# Patient Record
Sex: FEMALE | Race: WHITE | Employment: OTHER | URBAN - METROPOLITAN AREA
[De-identification: names, ages, dates, MRNs, and addresses within clinical notes are randomized per-mention and may not be internally consistent; named-entity substitution may affect disease eponyms.]

---

## 2017-05-02 ENCOUNTER — APPOINTMENT (OUTPATIENT)
Dept: GENERAL RADIOLOGY | Age: 77
End: 2017-05-02
Attending: FAMILY MEDICINE
Payer: MEDICARE

## 2017-05-02 ENCOUNTER — HOSPITAL ENCOUNTER (OUTPATIENT)
Age: 77
Discharge: HOME | End: 2017-05-02
Attending: FAMILY MEDICINE
Payer: MEDICARE

## 2017-05-02 VITALS
BODY MASS INDEX: 24.55 KG/M2 | DIASTOLIC BLOOD PRESSURE: 71 MMHG | HEIGHT: 61 IN | SYSTOLIC BLOOD PRESSURE: 106 MMHG | TEMPERATURE: 98 F | HEART RATE: 85 BPM | OXYGEN SATURATION: 100 % | WEIGHT: 130 LBS | RESPIRATION RATE: 20 BRPM

## 2017-05-02 DIAGNOSIS — J40 BRONCHITIS: Primary | ICD-10-CM

## 2017-05-02 PROCEDURE — 99203 OFFICE O/P NEW LOW 30 MIN: CPT

## 2017-05-02 PROCEDURE — 99204 OFFICE O/P NEW MOD 45 MIN: CPT

## 2017-05-02 PROCEDURE — 71020 XR CHEST PA + LAT CHEST (CPT=71020): CPT

## 2017-05-02 RX ORDER — BIOTIN 1 MG
2 TABLET ORAL DAILY
COMMUNITY

## 2017-05-02 RX ORDER — CODEINE PHOSPHATE AND GUAIFENESIN 10; 100 MG/5ML; MG/5ML
5 SOLUTION ORAL EVERY 6 HOURS PRN
Qty: 118 ML | Refills: 0 | Status: SHIPPED | OUTPATIENT
Start: 2017-05-02 | End: 2018-10-01 | Stop reason: ALTCHOICE

## 2017-05-02 RX ORDER — VALSARTAN 160 MG/1
160 TABLET ORAL DAILY
COMMUNITY
End: 2019-02-15

## 2017-05-02 RX ORDER — ALBUTEROL SULFATE 90 UG/1
2 AEROSOL, METERED RESPIRATORY (INHALATION) EVERY 4 HOURS PRN
Qty: 1 INHALER | Refills: 1 | Status: SHIPPED | OUTPATIENT
Start: 2017-05-02 | End: 2017-06-01

## 2017-05-02 RX ORDER — SIMVASTATIN 5 MG
20 TABLET ORAL NIGHTLY
COMMUNITY
End: 2018-10-01 | Stop reason: ALTCHOICE

## 2017-05-02 RX ORDER — LAMOTRIGINE 150 MG/1
150 TABLET ORAL DAILY
COMMUNITY
End: 2018-12-31

## 2017-05-02 NOTE — ED PROVIDER NOTES
Patient Seen in: [address]    History   Patient presents with:  Cough/URI    Stated Complaint: BAD COUGH    HPI    69year old patient with PMHx significant for HL presents with cough, congestion for 3 days.    [address] (36.6 °C)   Temp src 05/02/17 1229 Oral   SpO2 05/02/17 1229 100 %   O2 Device --        Current:/71 mmHg  Pulse 85  Temp(Src) 97.9 °F (36.6 °C) (Oral)  Resp 20  Ht 154.9 cm (5' 1\")  Wt 58.968 kg  BMI 24.58 kg/m2  SpO2 100%    GENERAL: NAD, well hyd on 5/02/2017 at 12:54       Approved by (CST): Christianne Noonan M.D. on 5/02/2017 at 12:55       Discussed viral nature of the patient's symptoms. She agrees she does not want an antibiotic at this time.   She is offered Cheratussin which she accepts a

## 2017-05-02 NOTE — ED INITIAL ASSESSMENT (HPI)
Per pt has been having cough for 3 days denies fevers or chills at home.  Non productive cough with runny nose and congestion

## 2017-05-19 ENCOUNTER — APPOINTMENT (OUTPATIENT)
Age: 77
Setting detail: DERMATOLOGY
End: 2017-05-24

## 2017-05-19 ENCOUNTER — RX ONLY (RX ONLY)
Age: 77
End: 2017-05-19

## 2017-05-19 VITALS
HEART RATE: 75 BPM | HEIGHT: 55 IN | WEIGHT: 130 LBS | SYSTOLIC BLOOD PRESSURE: 112 MMHG | DIASTOLIC BLOOD PRESSURE: 62 MMHG

## 2017-05-19 DIAGNOSIS — L81.4 OTHER MELANIN HYPERPIGMENTATION: ICD-10-CM

## 2017-05-19 DIAGNOSIS — L21.8 OTHER SEBORRHEIC DERMATITIS: ICD-10-CM

## 2017-05-19 DIAGNOSIS — T07XXXA ABRASION OR FRICTION BURN OF OTHER, MULTIPLE, AND UNSPECIFIED SITES, WITHOUT MENTION OF INFECTION: ICD-10-CM

## 2017-05-19 DIAGNOSIS — L82.1 OTHER SEBORRHEIC KERATOSIS: ICD-10-CM

## 2017-05-19 DIAGNOSIS — L30.4 ERYTHEMA INTERTRIGO: ICD-10-CM

## 2017-05-19 DIAGNOSIS — L82.0 INFLAMED SEBORRHEIC KERATOSIS: ICD-10-CM

## 2017-05-19 PROBLEM — S60.922A UNSPECIFIED SUPERFICIAL INJURY OF LEFT HAND, INITIAL ENCOUNTER: Status: ACTIVE | Noted: 2017-05-19

## 2017-05-19 PROCEDURE — OTHER MIPS QUALITY: OTHER

## 2017-05-19 PROCEDURE — OTHER COUNSELING: OTHER

## 2017-05-19 PROCEDURE — OTHER LIQUID NITROGEN: OTHER

## 2017-05-19 PROCEDURE — 99214 OFFICE O/P EST MOD 30 MIN: CPT | Mod: 25

## 2017-05-19 PROCEDURE — OTHER PRESCRIPTION: OTHER

## 2017-05-19 PROCEDURE — OTHER PATIENT SPECIFIC COUNSELING: OTHER

## 2017-05-19 PROCEDURE — OTHER TREATMENT REGIMEN: OTHER

## 2017-05-19 PROCEDURE — OTHER REASSURANCE: OTHER

## 2017-05-19 PROCEDURE — 17110 DESTRUCT B9 LESION 1-14: CPT

## 2017-05-19 RX ORDER — TRIAMCINOLONE ACETONIDE 1 MG/G
CREAM TOPICAL
Qty: 1 | Refills: 2 | Status: ERX | COMMUNITY
Start: 2017-05-19

## 2017-05-19 RX ORDER — MUPIROCIN 20 MG/G
OINTMENT TOPICAL
Qty: 1 | Refills: 0 | Status: ERX | COMMUNITY
Start: 2017-05-19

## 2017-05-19 RX ORDER — KETOCONAZOLE 20.5 MG/ML
SHAMPOO, SUSPENSION TOPICAL
Qty: 1 | Refills: 2 | Status: ERX | COMMUNITY
Start: 2017-05-19

## 2017-05-19 RX ORDER — KETOCONAZOLE 20 MG/G
CREAM TOPICAL
Qty: 1 | Refills: 2 | Status: ERX

## 2017-05-19 ASSESSMENT — LOCATION DETAILED DESCRIPTION DERM
LOCATION DETAILED: RIGHT INFERIOR UPPER BACK
LOCATION DETAILED: LEFT SUPERIOR MEDIAL UPPER BACK
LOCATION DETAILED: LEFT ANTERIOR PROXIMAL THIGH
LOCATION DETAILED: LEFT INFERIOR MEDIAL UPPER BACK
LOCATION DETAILED: LEFT MEDIAL FRONTAL SCALP
LOCATION DETAILED: LEFT INFERIOR FOREHEAD
LOCATION DETAILED: LEFT MEDIAL SUPERIOR CHEST
LOCATION DETAILED: RIGHT ANTERIOR SHOULDER
LOCATION DETAILED: LEFT SUPERIOR UPPER BACK
LOCATION DETAILED: LEFT RADIAL DORSAL HAND
LOCATION DETAILED: RIGHT MEDIAL BREAST 5-6:00 REGION
LOCATION DETAILED: LEFT ANTERIOR PROXIMAL UPPER ARM
LOCATION DETAILED: RIGHT INFERIOR MEDIAL FOREHEAD
LOCATION DETAILED: LEFT MID-UPPER BACK
LOCATION DETAILED: LEFT SUPERIOR PARIETAL SCALP
LOCATION DETAILED: LEFT MEDIAL BREAST 6-7:00 REGION
LOCATION DETAILED: LEFT PROXIMAL CALF
LOCATION DETAILED: RIGHT CLAVICULAR SKIN
LOCATION DETAILED: RIGHT MID-UPPER BACK

## 2017-05-19 ASSESSMENT — LOCATION SIMPLE DESCRIPTION DERM
LOCATION SIMPLE: LEFT UPPER BACK
LOCATION SIMPLE: LEFT SCALP
LOCATION SIMPLE: LEFT UPPER ARM
LOCATION SIMPLE: LEFT CALF
LOCATION SIMPLE: SCALP
LOCATION SIMPLE: RIGHT BREAST
LOCATION SIMPLE: RIGHT UPPER BACK
LOCATION SIMPLE: CHEST
LOCATION SIMPLE: LEFT THIGH
LOCATION SIMPLE: LEFT FOREHEAD
LOCATION SIMPLE: LEFT BREAST
LOCATION SIMPLE: RIGHT SHOULDER
LOCATION SIMPLE: RIGHT CLAVICULAR SKIN
LOCATION SIMPLE: RIGHT FOREHEAD
LOCATION SIMPLE: LEFT HAND

## 2017-05-19 ASSESSMENT — SEVERITY ASSESSMENT
HOW SEVERE IS THIS PATIENT'S CONDITION?: MILD
SEVERITY: MILD

## 2017-05-19 ASSESSMENT — LOCATION ZONE DERM
LOCATION ZONE: TRUNK
LOCATION ZONE: FACE
LOCATION ZONE: LEG
LOCATION ZONE: ARM
LOCATION ZONE: HAND
LOCATION ZONE: SCALP

## 2017-05-19 ASSESSMENT — PAIN INTENSITY VAS: HOW INTENSE IS YOUR PAIN 0 BEING NO PAIN, 10 BEING THE MOST SEVERE PAIN POSSIBLE?: NO PAIN

## 2017-05-19 ASSESSMENT — BSA RASH: BSA RASH: 2

## 2017-05-19 NOTE — PROCEDURE: LIQUID NITROGEN
Post-Care Instructions: I reviewed with the patient in detail post-care instructions. Patient is to wear sunprotection, and avoid picking at any of the treated lesions. Pt may apply Vaseline to crusted or scabbing areas.
Number Of Freeze-Thaw Cycles: 2 freeze-thaw cycles
Include Z78.9 (Other Specified Conditions Influencing Health Status) As An Associated Diagnosis?: No
Medical Necessity Information: It is in your best interest to select a reason for this procedure from the list below. All of these items fulfill various CMS LCD requirements except the new and changing color options.
Medical Necessity Clause: This procedure was medically necessary because the lesions that were treated were:
Detail Level: Simple
Consent: The patient's consent was obtained including but not limited to risks of crusting, scabbing, blistering, scarring, darker or lighter pigmentary change, recurrence, incomplete removal and infection.

## 2017-05-19 NOTE — HPI: SECONDARY COMPLAINT
How Severe Is This Condition?: mild
Additional History: Patient is unsure whether the area is a cut or burn, she just noticed it yesterday.

## 2017-05-19 NOTE — PROCEDURE: PATIENT SPECIFIC COUNSELING
Detail Level: Detailed
Explained moisture exacerbates condition. Recommended gently blow drying areas to ensure skin is fully dry before getting dressed. Condition is likely chronic but may be well aided with topical Ketoconazole 2% cream regimen.

## 2017-11-28 ENCOUNTER — APPOINTMENT (OUTPATIENT)
Age: 77
Setting detail: DERMATOLOGY
End: 2017-12-01

## 2017-11-28 ENCOUNTER — RX ONLY (RX ONLY)
Age: 77
End: 2017-11-28

## 2017-11-28 VITALS
SYSTOLIC BLOOD PRESSURE: 106 MMHG | HEART RATE: 73 BPM | DIASTOLIC BLOOD PRESSURE: 56 MMHG | HEIGHT: 61 IN | WEIGHT: 130 LBS

## 2017-11-28 DIAGNOSIS — D22 MELANOCYTIC NEVI: ICD-10-CM

## 2017-11-28 DIAGNOSIS — L82.1 OTHER SEBORRHEIC KERATOSIS: ICD-10-CM

## 2017-11-28 DIAGNOSIS — L30.4 ERYTHEMA INTERTRIGO: ICD-10-CM

## 2017-11-28 DIAGNOSIS — R20.2 PARESTHESIA OF SKIN: ICD-10-CM

## 2017-11-28 DIAGNOSIS — L21.8 OTHER SEBORRHEIC DERMATITIS: ICD-10-CM

## 2017-11-28 PROBLEM — D48.5 NEOPLASM OF UNCERTAIN BEHAVIOR OF SKIN: Status: ACTIVE | Noted: 2017-11-28

## 2017-11-28 PROCEDURE — 11100: CPT

## 2017-11-28 PROCEDURE — OTHER COUNSELING: OTHER

## 2017-11-28 PROCEDURE — OTHER PATIENT SPECIFIC COUNSELING: OTHER

## 2017-11-28 PROCEDURE — OTHER TREATMENT REGIMEN: OTHER

## 2017-11-28 PROCEDURE — OTHER MIPS QUALITY: OTHER

## 2017-11-28 PROCEDURE — OTHER REASSURANCE: OTHER

## 2017-11-28 PROCEDURE — OTHER BIOPSY BY SHAVE METHOD: OTHER

## 2017-11-28 PROCEDURE — 99214 OFFICE O/P EST MOD 30 MIN: CPT | Mod: 25

## 2017-11-28 RX ORDER — KETOCONAZOLE 20 MG/G
CREAM TOPICAL
Qty: 1 | Refills: 2 | Status: ERX

## 2017-11-28 ASSESSMENT — LOCATION DETAILED DESCRIPTION DERM
LOCATION DETAILED: LEFT MEDIAL BREAST 6-7:00 REGION
LOCATION DETAILED: LEFT MID-UPPER BACK
LOCATION DETAILED: LEFT SUPERIOR MEDIAL UPPER BACK
LOCATION DETAILED: LEFT LATERAL SUPERIOR CHEST
LOCATION DETAILED: MIDDLE STERNUM
LOCATION DETAILED: RIGHT MEDIAL BREAST 5-6:00 REGION
LOCATION DETAILED: RIGHT INFERIOR UPPER BACK
LOCATION DETAILED: MID-FRONTAL SCALP

## 2017-11-28 ASSESSMENT — LOCATION SIMPLE DESCRIPTION DERM
LOCATION SIMPLE: LEFT UPPER BACK
LOCATION SIMPLE: CHEST
LOCATION SIMPLE: LEFT BREAST
LOCATION SIMPLE: RIGHT BREAST
LOCATION SIMPLE: ANTERIOR SCALP
LOCATION SIMPLE: RIGHT UPPER BACK

## 2017-11-28 ASSESSMENT — LOCATION ZONE DERM
LOCATION ZONE: SCALP
LOCATION ZONE: TRUNK

## 2017-11-28 ASSESSMENT — ITCH INTENSITY: HOW SEVERE IS YOUR ITCHING?: 5

## 2017-11-28 ASSESSMENT — SEVERITY ASSESSMENT
HOW SEVERE IS THIS PATIENT'S CONDITION?: MILD
SEVERITY: MILD

## 2017-11-28 ASSESSMENT — BSA RASH: BSA RASH: 2

## 2017-11-28 ASSESSMENT — PAIN INTENSITY VAS: HOW INTENSE IS YOUR PAIN 0 BEING NO PAIN, 10 BEING THE MOST SEVERE PAIN POSSIBLE?: NO PAIN

## 2017-11-28 NOTE — PROCEDURE: BIOPSY BY SHAVE METHOD
Biopsy Method: Personna blade
Electrodesiccation Text: The wound bed was treated with electrodesiccation after the biopsy was performed.
Anesthesia Volume In Cc (Will Not Render If 0): 0.5
Size Of Lesion In Cm: 1
Consent: Written consent was obtained and risks were reviewed including but not limited to scarring, infection, bleeding, scabbing, incomplete removal, nerve damage and allergy to anesthesia.
Post-Care Instructions: I reviewed with the patient in detail post-care instructions. Patient is to keep the biopsy site dry overnight, and then apply bacitracin twice daily until healed. Patient may apply hydrogen peroxide soaks to remove any crusting.
Bill 64507 For Specimen Handling/Conveyance To Laboratory?: no
Cryotherapy Text: The wound bed was treated with cryotherapy after the biopsy was performed.
Notification Instructions: Patient will be notified of biopsy results. However, patient instructed to call the office if not contacted within 2 weeks.
Biopsy Type: H and E
Electrodesiccation And Curettage Text: The wound bed was treated with electrodesiccation and curettage after the biopsy was performed.
Type Of Destruction Used: Curettage
Billing Type: Third-Party Bill
Hemostasis: Electrocautery
Detail Level: Detailed
Silver Nitrate Text: The wound bed was treated with silver nitrate after the biopsy was performed.
Dressing: bandage
Additional Anesthesia Volume In Cc (Will Not Render If 0): 0
Wound Care: Vaseline

## 2017-11-28 NOTE — PROCEDURE: MIPS QUALITY
Quality 265: Biopsy Follow-Up: Biopsy results reviewed, communicated, tracked, and documented
Detail Level: Zone
Quality 131: Pain Assessment And Follow-Up: Pain assessment using a standardized tool is documented as negative, no follow-up plan required
Quality 226: Preventive Care And Screening: Tobacco Use: Screening And Cessation Intervention: Patient screened for tobacco and is an ex-smoker

## 2017-11-28 NOTE — PROCEDURE: PATIENT SPECIFIC COUNSELING
Detail Level: Detailed
Discussed alternative treatment options including topical solution due to patient not preferring shampoo form. Patient defers any alternative treatment options and elects to continue Ketoconazole 2% shampoo as needed.
Explained that the condition is due to sensory nerve itching and irritation. Use of topical steroid may temporarily relieve the condition as well as application of cold compress. Condition is likely long term and is difficult to maintain.

## 2018-05-22 ENCOUNTER — APPOINTMENT (OUTPATIENT)
Age: 78
Setting detail: DERMATOLOGY
End: 2018-05-26

## 2018-05-22 VITALS
DIASTOLIC BLOOD PRESSURE: 66 MMHG | WEIGHT: 130 LBS | HEIGHT: 61 IN | SYSTOLIC BLOOD PRESSURE: 113 MMHG | HEART RATE: 73 BPM

## 2018-05-22 DIAGNOSIS — D22 MELANOCYTIC NEVI: ICD-10-CM

## 2018-05-22 DIAGNOSIS — L81.4 OTHER MELANIN HYPERPIGMENTATION: ICD-10-CM

## 2018-05-22 DIAGNOSIS — D18.0 HEMANGIOMA: ICD-10-CM

## 2018-05-22 DIAGNOSIS — L82.1 OTHER SEBORRHEIC KERATOSIS: ICD-10-CM

## 2018-05-22 PROBLEM — D22.72 MELANOCYTIC NEVI OF LEFT LOWER LIMB, INCLUDING HIP: Status: ACTIVE | Noted: 2018-05-22

## 2018-05-22 PROBLEM — D22.71 MELANOCYTIC NEVI OF RIGHT LOWER LIMB, INCLUDING HIP: Status: ACTIVE | Noted: 2018-05-22

## 2018-05-22 PROBLEM — D22.5 MELANOCYTIC NEVI OF TRUNK: Status: ACTIVE | Noted: 2018-05-22

## 2018-05-22 PROBLEM — D18.01 HEMANGIOMA OF SKIN AND SUBCUTANEOUS TISSUE: Status: ACTIVE | Noted: 2018-05-22

## 2018-05-22 PROCEDURE — OTHER REASSURANCE: OTHER

## 2018-05-22 PROCEDURE — 99214 OFFICE O/P EST MOD 30 MIN: CPT

## 2018-05-22 PROCEDURE — OTHER MIPS QUALITY: OTHER

## 2018-05-22 ASSESSMENT — LOCATION ZONE DERM
LOCATION ZONE: LEG
LOCATION ZONE: FACE
LOCATION ZONE: ARM
LOCATION ZONE: NECK
LOCATION ZONE: TRUNK

## 2018-05-22 ASSESSMENT — LOCATION SIMPLE DESCRIPTION DERM
LOCATION SIMPLE: TRAPEZIAL NECK
LOCATION SIMPLE: RIGHT CALF
LOCATION SIMPLE: LEFT UPPER BACK
LOCATION SIMPLE: RIGHT UPPER BACK
LOCATION SIMPLE: CHEST
LOCATION SIMPLE: RIGHT POSTERIOR UPPER ARM
LOCATION SIMPLE: LEFT THIGH
LOCATION SIMPLE: LEFT POSTERIOR UPPER ARM
LOCATION SIMPLE: RIGHT CHEEK
LOCATION SIMPLE: LEFT FOREHEAD

## 2018-05-22 ASSESSMENT — LOCATION DETAILED DESCRIPTION DERM
LOCATION DETAILED: LEFT PROXIMAL POSTERIOR UPPER ARM
LOCATION DETAILED: LEFT ANTERIOR PROXIMAL THIGH
LOCATION DETAILED: RIGHT INFERIOR UPPER BACK
LOCATION DETAILED: RIGHT MEDIAL MALAR CHEEK
LOCATION DETAILED: RIGHT PROXIMAL CALF
LOCATION DETAILED: LEFT MEDIAL UPPER BACK
LOCATION DETAILED: UPPER STERNUM
LOCATION DETAILED: RIGHT SUPERIOR MEDIAL UPPER BACK
LOCATION DETAILED: LEFT MEDIAL FOREHEAD
LOCATION DETAILED: MID TRAPEZIAL NECK
LOCATION DETAILED: RIGHT PROXIMAL POSTERIOR UPPER ARM

## 2018-05-22 NOTE — HPI: SKIN LESION
How Severe Is Your Skin Lesion?: mild
Has Your Skin Lesion Been Treated?: not been treated
Is This A New Presentation, Or A Follow-Up?: Skin Lesion
Additional History: Patient states Dr. Jerez stated lesion was nothing to worry about, but she would like lesion evaluated.

## 2018-10-01 ENCOUNTER — OFFICE VISIT (OUTPATIENT)
Dept: FAMILY MEDICINE CLINIC | Facility: CLINIC | Age: 78
End: 2018-10-01
Payer: MEDICARE

## 2018-10-01 VITALS
TEMPERATURE: 97 F | DIASTOLIC BLOOD PRESSURE: 73 MMHG | HEIGHT: 60 IN | RESPIRATION RATE: 18 BRPM | HEART RATE: 76 BPM | BODY MASS INDEX: 24.5 KG/M2 | WEIGHT: 124.81 LBS | SYSTOLIC BLOOD PRESSURE: 124 MMHG

## 2018-10-01 DIAGNOSIS — Z00.00 ENCOUNTER FOR ANNUAL HEALTH EXAMINATION: ICD-10-CM

## 2018-10-01 DIAGNOSIS — Z00.00 ROUTINE PHYSICAL EXAMINATION: Primary | ICD-10-CM

## 2018-10-01 DIAGNOSIS — Z12.31 ENCOUNTER FOR SCREENING MAMMOGRAM FOR BREAST CANCER: ICD-10-CM

## 2018-10-01 DIAGNOSIS — F31.70 BIPOLAR AFFECTIVE DISORDER IN REMISSION (HCC): ICD-10-CM

## 2018-10-01 DIAGNOSIS — M54.16 LUMBAR RADICULOPATHY: ICD-10-CM

## 2018-10-01 DIAGNOSIS — I10 ESSENTIAL HYPERTENSION: ICD-10-CM

## 2018-10-01 DIAGNOSIS — Z12.11 COLON CANCER SCREENING: ICD-10-CM

## 2018-10-01 DIAGNOSIS — E78.5 HYPERLIPIDEMIA, UNSPECIFIED HYPERLIPIDEMIA TYPE: ICD-10-CM

## 2018-10-01 PROCEDURE — G0439 PPPS, SUBSEQ VISIT: HCPCS | Performed by: FAMILY MEDICINE

## 2018-10-01 RX ORDER — ATORVASTATIN CALCIUM 10 MG/1
10 TABLET, FILM COATED ORAL NIGHTLY
COMMUNITY
End: 2019-05-20

## 2018-10-01 RX ORDER — DIAZEPAM 2 MG/1
2 TABLET ORAL EVERY 6 HOURS PRN
COMMUNITY
End: 2018-10-29

## 2018-10-01 NOTE — PROGRESS NOTES
HPI:    Patient ID: Elaine Perez is a 69year old female. HPI    Review of Systems         Current Outpatient Medications:  atorvastatin 10 MG Oral Tab Take 10 mg by mouth nightly.  Disp:  Rfl:    diazepam 2 MG Oral Tab Take 2 mg by mouth every 6 (si medication    Hyperlipidemia–continuing on atorvastatin    Bipolar affective disorder in remission –patient has seen psychiatrists and therapists in the past.  Diagnosed with bipolar disorder.   Has been stable on lamotrigine and diazepam at at bedtime for

## 2018-10-01 NOTE — PROGRESS NOTES
HPI:   Philip Melchor is a 69year old female who presents for a Medicare Subsequent Annual Wellness visit (Pt already had Initial Annual Wellness).     Generally well, recently moved  Her last annual assessment has been over 1 year: Annual Physical due weeks)?: Several days  PHQ-2 SCORE: 1     Advanced Directive:   She does NOT have a Living Will on file in [facility] [address]    Advance care planning including the explanation and discussion of advance directives standard forms performed Face to Face with patient and F Cap Take 1 tablet by mouth daily. valsartan 160 MG Oral Tab Take 160 mg by mouth daily. MEDICAL INFORMATION:   She  has a past medical history of Depression, Essential hypertension, and Hyperlipidemia.     She  has a past surgical history that includ me they think I may have hearing loss:   No              Visual Acuity  Right Eye Visual Acuity: Uncorrected Right Eye Chart Acuity: 20/50   Left Eye Visual Acuity: Uncorrected Left Eye Chart Acuity: 20/50   Both Eyes Visual Acuity: Uncorrected Both Eyes Ch screening  -     EVALUATE & TREAT, GASTRO (INTERNAL)    Essential hypertension    Hyperlipidemia, unspecified hyperlipidemia type    Lumbar radiculopathy  -     PHYSICAL THERAPY - INTERNAL    Bipolar affective disorder in remission [facility])    Encounter for a side effects. May reconsider in the future. Referred for physical therapy which has been effective in the past.    Diet assessment: good     PLAN:  The patient indicates understanding of these issues and agrees to the plan.   Reinforced healthy diet, life and Pelvic      Pap: Every 3 yrs age 21-68 or Pap+HPV every 5 yrs age 33-67, age 72 and older at high risk There are no preventive care reminders to display for this patient.  Update Health Maintenance if applicable    Chlamydia  Annually if high risk No re No flowsheet data found.                Template: [employer] ANNUAL ASSESSMENT FEMALE [69316]

## 2018-10-01 NOTE — PATIENT INSTRUCTIONS
[facility]  Physical therapist in [address]  Address: [place] [address], [address] [facility]  Phone: [phone]    [facility]3]  Physical therapy clinic in [address]  Address: [address] [facility], [facility] lifetime   • Anyone with a family history    Colorectal Cancer Screening  Covered up to Age 76     Colonoscopy Screen   Covered every 10 years- more often if abnormal There are no preventive care reminders to display for this patient.  Update [place]ub[place] Please get every year    Pneumococcal 13 (Prevnar)  Covered Once after 65 No orders found for this or any previous visit.  Please get once after your 65th birthday    Pneumococcal 23 (Pneumovax)  Covered Once after 65 No orders found for this or any previou

## 2018-10-11 ENCOUNTER — TELEPHONE (OUTPATIENT)
Dept: GASTROENTEROLOGY | Facility: CLINIC | Age: 78
End: 2018-10-11

## 2018-10-11 NOTE — TELEPHONE ENCOUNTER
Per CSS Joan Chandler, pt confirmed the following appt and cancelled the 10/30/18 date.     Future Appointments   Date Time Provider Zhang Aguilar   10/18/2018  8:00 AM ANA [facility] Javier AQUINO

## 2018-10-11 NOTE — TELEPHONE ENCOUNTER
NEW PATIENT NEVER SEEN BY GI    LM to offer appt sooner consult with [facility] on 10/18/18 @ 8AM, must arrive by 7:45AM at [facility]. CSS please confirm appt and re-route to RN's as FYI.

## 2018-10-11 NOTE — TELEPHONE ENCOUNTER
Pt daughter calling to see if pt can be seen sooner then scheduled 10/30/18 daughter is concerned because pt is losing a lot of weight please call thank you.

## 2018-10-15 NOTE — H&P
[address] [facility] [address] Gastroenterology                                                                                                  Clinic History and Physical     Pa PCPs office in [place] compared with her newly established PCP in [facility]. Today in office her weight is up 228 pounds. She reports her baseline is 131 pounds. She does not on a scale at home and does not weigh herself regularly.       She believes this Social History: Social History    Tobacco Use      Smoking status: Former Smoker      Smokeless tobacco: Never Used      Tobacco comment: quit 20 to 15 yrs ago    Alcohol use:  Yes      Alcohol/week: 0.6 oz      Types: 1 Glasses of wine per week      D moist  CV: regular rate and rhythm, the extremities are warm and well perfused   Lung: effort normal and breath sounds normal, no respiratory distress, wheezes or rales  GI: soft, non-tender exam in all quadrants without rigidity or guarding, non-distended PPIs including the risks, benefits, limitations of both medications. She has elected to continue with omeprazole 20 mg daily. Rx sent to pharmacy.     3.  Weight loss: Patient's recent weight loss may have been in part due to forgetting meals and decrease Sig: As directed per GI consult notes   • Omeprazole 20 MG Oral Tab EC 90 tablet 1     Sig: Take 1 tablet by mouth daily. Imaging & Referrals:  None       Cc: Dr. Donaldo Arce.  ANA Bartlett  10/18/2018

## 2018-10-18 ENCOUNTER — OFFICE VISIT (OUTPATIENT)
Dept: GASTROENTEROLOGY | Facility: CLINIC | Age: 78
End: 2018-10-18
Payer: MEDICARE

## 2018-10-18 ENCOUNTER — TELEPHONE (OUTPATIENT)
Dept: GASTROENTEROLOGY | Facility: CLINIC | Age: 78
End: 2018-10-18

## 2018-10-18 VITALS
WEIGHT: 128.38 LBS | DIASTOLIC BLOOD PRESSURE: 76 MMHG | HEART RATE: 73 BPM | SYSTOLIC BLOOD PRESSURE: 122 MMHG | BODY MASS INDEX: 25.2 KG/M2 | HEIGHT: 60 IN

## 2018-10-18 DIAGNOSIS — K21.9 GERD WITHOUT ESOPHAGITIS: ICD-10-CM

## 2018-10-18 DIAGNOSIS — R63.4 WEIGHT LOSS: ICD-10-CM

## 2018-10-18 DIAGNOSIS — Z12.11 COLON CANCER SCREENING: Primary | ICD-10-CM

## 2018-10-18 DIAGNOSIS — Z12.11 SCREENING FOR COLON CANCER: Primary | ICD-10-CM

## 2018-10-18 PROCEDURE — 99203 OFFICE O/P NEW LOW 30 MIN: CPT | Performed by: NURSE PRACTITIONER

## 2018-10-18 PROCEDURE — G0463 HOSPITAL OUTPT CLINIC VISIT: HCPCS | Performed by: NURSE PRACTITIONER

## 2018-10-18 RX ORDER — POLYETHYLENE GLYCOL 3350, SODIUM CHLORIDE, SODIUM BICARBONATE, POTASSIUM CHLORIDE 420; 11.2; 5.72; 1.48 G/4L; G/4L; G/4L; G/4L
POWDER, FOR SOLUTION ORAL
Qty: 1 BOTTLE | Refills: 0 | Status: SHIPPED | OUTPATIENT
Start: 2018-10-18 | End: 2019-03-11 | Stop reason: ALTCHOICE

## 2018-10-18 RX ORDER — NICOTINE POLACRILEX 4 MG/1
1 GUM, CHEWING ORAL DAILY
Qty: 90 TABLET | Refills: 1 | Status: SHIPPED | OUTPATIENT
Start: 2018-10-18

## 2018-10-18 NOTE — PATIENT INSTRUCTIONS
1. Schedule colonoscopy with Dr. Cathrine Cranker w/ [facility]    2.  bowel prep from pharmacy - split dose TriLyte     3. Continue all medications for procedure     4. Read all bowel prep instructions carefully    5.  AVOID seeds, nuts, popcorn, raw fruits and vege

## 2018-10-18 NOTE — TELEPHONE ENCOUNTER
Scheduled for:  Colonoscopy - 24219  Provider Name:  Dr. Oma Gibbons  Date:  10/31/18  Location:  [facility]  Sedation:  [facility]  Time:  12:30 pm (pt is aware to arrive at 11:30 am)  Prep:  Trilyte, Prep instructions were given to pt in the office, pt verbalized und

## 2018-10-19 ENCOUNTER — TELEPHONE (OUTPATIENT)
Dept: GASTROENTEROLOGY | Facility: CLINIC | Age: 78
End: 2018-10-19

## 2018-10-19 DIAGNOSIS — Z12.11 COLON CANCER SCREENING: Primary | ICD-10-CM

## 2018-10-19 NOTE — TELEPHONE ENCOUNTER
Pts daughter [facility] called again about rescheduling. Please call. Aware of 72 hr call back time.

## 2018-10-22 NOTE — TELEPHONE ENCOUNTER
Rescheduled for:  Colonoscopy [address]  Provider Name:  Dr. Dagoberto Lees  Date:    From-10/31/18 To-11/7/18  [facility]  Sedation:  [facility]  Time:    From-1230   To-1315 (pt is aware to arrive at 1215)  Prep:  Percy Camejo prep instructions sent on 10/23

## 2018-10-29 RX ORDER — DIAZEPAM 2 MG/1
2 TABLET ORAL NIGHTLY PRN
Qty: 30 TABLET | Refills: 5 | Status: SHIPPED
Start: 2018-10-29

## 2018-10-29 NOTE — TELEPHONE ENCOUNTER
Per pt she is new to the pharmacy anf she needs a refill on her   diazepam 2 MG Oral Tab. Current Outpatient Medications:                    diazepam 2 MG Oral Tab Take 2 mg by mouth every 6 (six) hours as needed for Anxiety.  Disp:  Rfl:

## 2018-10-29 NOTE — TELEPHONE ENCOUNTER
Please advise on refill request.     Refill Protocol Appointment Criteria  · Appointment scheduled in the past 6 months or in the next 3 months  Recent Outpatient Visits            1 week ago Screening for colon cancer    [address] [facility], [address] [place] [address],

## 2018-11-07 ENCOUNTER — HOSPITAL ENCOUNTER (OUTPATIENT)
Age: 78
Setting detail: HOSPITAL OUTPATIENT SURGERY
Discharge: HOME | End: 2018-11-07
Attending: INTERNAL MEDICINE | Admitting: INTERNAL MEDICINE
Payer: MEDICARE

## 2018-11-07 ENCOUNTER — ANESTHESIA EVENT (OUTPATIENT)
Dept: ENDOSCOPY | Age: 78
End: 2018-11-07
Payer: MEDICARE

## 2018-11-07 ENCOUNTER — ANESTHESIA (OUTPATIENT)
Dept: ENDOSCOPY | Age: 78
End: 2018-11-07
Payer: MEDICARE

## 2018-11-07 VITALS
RESPIRATION RATE: 20 BRPM | HEIGHT: 60 IN | HEART RATE: 79 BPM | DIASTOLIC BLOOD PRESSURE: 70 MMHG | WEIGHT: 127 LBS | OXYGEN SATURATION: 98 % | SYSTOLIC BLOOD PRESSURE: 124 MMHG | BODY MASS INDEX: 24.94 KG/M2

## 2018-11-07 DIAGNOSIS — Z12.11 COLON CANCER SCREENING: ICD-10-CM

## 2018-11-07 PROCEDURE — 45378 DIAGNOSTIC COLONOSCOPY: CPT | Performed by: INTERNAL MEDICINE

## 2018-11-07 RX ORDER — SODIUM CHLORIDE, SODIUM LACTATE, POTASSIUM CHLORIDE, CALCIUM CHLORIDE 600; 310; 30; 20 MG/100ML; MG/100ML; MG/100ML; MG/100ML
INJECTION, SOLUTION INTRAVENOUS CONTINUOUS
Status: DISCONTINUED | OUTPATIENT
Start: 2018-11-07 | End: 2018-11-07

## 2018-11-07 RX ORDER — NALOXONE HYDROCHLORIDE 0.4 MG/ML
80 INJECTION, SOLUTION INTRAMUSCULAR; INTRAVENOUS; SUBCUTANEOUS AS NEEDED
Status: CANCELLED | OUTPATIENT
Start: 2018-11-07 | End: 2018-11-07

## 2018-11-07 RX ORDER — LIDOCAINE HYDROCHLORIDE 10 MG/ML
INJECTION, SOLUTION EPIDURAL; INFILTRATION; INTRACAUDAL; PERINEURAL AS NEEDED
Status: DISCONTINUED | OUTPATIENT
Start: 2018-11-07 | End: 2018-11-07 | Stop reason: SURG

## 2018-11-07 RX ORDER — SODIUM CHLORIDE, SODIUM LACTATE, POTASSIUM CHLORIDE, CALCIUM CHLORIDE 600; 310; 30; 20 MG/100ML; MG/100ML; MG/100ML; MG/100ML
INJECTION, SOLUTION INTRAVENOUS CONTINUOUS
Status: CANCELLED | OUTPATIENT
Start: 2018-11-07

## 2018-11-07 RX ADMIN — SODIUM CHLORIDE, SODIUM LACTATE, POTASSIUM CHLORIDE, CALCIUM CHLORIDE: 600; 310; 30; 20 INJECTION, SOLUTION INTRAVENOUS at 13:55:00

## 2018-11-07 RX ADMIN — LIDOCAINE HYDROCHLORIDE 50 MG: 10 INJECTION, SOLUTION EPIDURAL; INFILTRATION; INTRACAUDAL; PERINEURAL at 13:58:00

## 2018-11-07 NOTE — OPERATIVE REPORT
COLONOSCOPY PROCEDURE REPORT     DATE OF PROCEDURE:  11/7/2018     PCP: Alva Willard. Jennifer Cornell MD     PREOPERATIVE DIAGNOSIS: Screening colonoscopy examination     POSTOPERATIVE DIAGNOSIS:  See impression. SURGEON:  Abdoul Garcia M.D.     Hiro Lugo:

## 2018-11-07 NOTE — ANESTHESIA PREPROCEDURE EVALUATION
Anesthesia PreOp Note    HPI:     Taylor Oswald is a 69year old female who presents for preoperative consultation requested by: Gertrude Prieto MD    Date of Surgery: 11/7/2018    Procedure(s):  COLONOSCOPY  Indication: Colon cancer screening Onset   • Other (Other) Father    • Other (Other) Mother      Social History    Socioeconomic History      Marital status:        Spouse name: Not on file      Number of children: Not on file      Years of education: Not on file      Highest educatio proceed with surgery and anesthesia as planned.    Informed Consent Plan and Risks Discussed With:  Patient      I have informed Gaila Sic and/or legal guardian or family member of the nature of the anesthetic plan, benefits, risks including possible

## 2018-11-07 NOTE — ANESTHESIA POSTPROCEDURE EVALUATION
Patient: Chan Corona    Procedure Summary     Date:  11/07/18 Room / Location:  [facility] ENDOSCOPY 01 / [facility] ENDO    Anesthesia Start:  8867 Anesthesia Stop:  6061    Procedure:  COLONOSCOPY (N/A ) Diagnosis:       Colon cancer screening      (Diverticu

## 2018-11-07 NOTE — H&P
History & Physical Examination    Patient Name: Lilibeth Lees  MRN: [id number]  CSN: [id number]  YOB: 1940    Diagnosis: Screening colonoscopy examination    Present Illness:    See recent office visit with Petra Hernandez NP.   Average risk is Normal If not normal, please explain:   LUZMA [ ] Sayda.Robby ]    Cruz Lantigua [ ] [ ]    HEART [ X ] [ X ]    LUNGS [ X ] [ X ]    Terrmiri Fess [ X ] [ X ]    Taylor Mcrae [ ] [ ]    EXTREMITIES [ ] [ ]    OTHER        See Anesthesia documentation    [ x ] I have disc

## 2018-12-31 ENCOUNTER — TELEPHONE (OUTPATIENT)
Dept: FAMILY MEDICINE CLINIC | Facility: CLINIC | Age: 78
End: 2018-12-31

## 2018-12-31 RX ORDER — LAMOTRIGINE 150 MG/1
150 TABLET ORAL DAILY
Qty: 90 TABLET | Refills: 1 | Status: SHIPPED | OUTPATIENT
Start: 2018-12-31

## 2018-12-31 NOTE — TELEPHONE ENCOUNTER
Patient contacted. Refill done but recommend psychiatry evaluation. Referred to comprehensive clinical services. In addition, we have not received old records, she will come in the office and sign another release if needed.

## 2018-12-31 NOTE — TELEPHONE ENCOUNTER
Pt is calling to request the medication Lamotrigine, pt states she would get this medication in [place] prior to moving here to [place] from her dr for 6 years. Pt states she would get this medication dosage of 150 mg.  Pt states she would take medication once

## 2019-01-07 ENCOUNTER — APPOINTMENT (OUTPATIENT)
Dept: GENERAL RADIOLOGY | Age: 79
End: 2019-01-07
Attending: FAMILY MEDICINE
Payer: MEDICARE

## 2019-01-07 ENCOUNTER — HOSPITAL ENCOUNTER (OUTPATIENT)
Age: 79
Discharge: HOME | End: 2019-01-07
Attending: FAMILY MEDICINE
Payer: MEDICARE

## 2019-01-07 VITALS
SYSTOLIC BLOOD PRESSURE: 115 MMHG | RESPIRATION RATE: 22 BRPM | HEART RATE: 79 BPM | TEMPERATURE: 97 F | OXYGEN SATURATION: 97 % | DIASTOLIC BLOOD PRESSURE: 66 MMHG

## 2019-01-07 DIAGNOSIS — J40 BRONCHITIS: Primary | ICD-10-CM

## 2019-01-07 PROCEDURE — 71046 X-RAY EXAM CHEST 2 VIEWS: CPT | Performed by: FAMILY MEDICINE

## 2019-01-07 PROCEDURE — 99214 OFFICE O/P EST MOD 30 MIN: CPT

## 2019-01-07 PROCEDURE — 99213 OFFICE O/P EST LOW 20 MIN: CPT

## 2019-01-07 RX ORDER — AZITHROMYCIN 250 MG/1
TABLET, FILM COATED ORAL
Qty: 1 PACKAGE | Refills: 0 | Status: SHIPPED | OUTPATIENT
Start: 2019-01-07 | End: 2019-01-12

## 2019-01-07 RX ORDER — CODEINE PHOSPHATE AND GUAIFENESIN 10; 100 MG/5ML; MG/5ML
5 SOLUTION ORAL EVERY 6 HOURS PRN
Qty: 120 ML | Refills: 0 | Status: SHIPPED | OUTPATIENT
Start: 2019-01-07 | End: 2019-01-14

## 2019-01-07 NOTE — ED INITIAL ASSESSMENT (HPI)
Pt her with complaints of cough that began on Thursday, pt states on thrusday she had fevers or chils, pt states she has had some chest congestion and tightness, pt wants to make sure its not pneumonia

## 2019-01-07 NOTE — ED PROVIDER NOTES
Patient Seen in: [address]    History   Patient presents with:  Cough/URI    Stated Complaint: SORE THROAT, HEADACHE, FEVER COUGH    HPI    Patient here with cough, congestion for 6 days.   No travel, no known sick contact use: No      Review of Systems    Positive for stated complaint: SORE THROAT, HEADACHE, FEVER COUGH  Other systems are as noted in HPI. Constitutional and vital signs reviewed. All other systems reviewed and negative except as noted above.     [facility] el history of COPD. Given her symptoms of infection seem to be resolving a wait-and-see antibiotic has been prescribed today with instruction on how to take and when to start.   She is also been provided with her requested codeine cough based syrup for nightt

## 2019-02-08 ENCOUNTER — TELEPHONE (OUTPATIENT)
Dept: FAMILY MEDICINE CLINIC | Facility: CLINIC | Age: 79
End: 2019-02-08

## 2019-02-08 NOTE — TELEPHONE ENCOUNTER
Leandra Berger from Dr. Rodriguez [facility] office called in to advise that they sent forms to the office fax: [phone]. Leandra Berger is requesting confirmation.   Please advise

## 2019-02-08 NOTE — TELEPHONE ENCOUNTER
Called office, they are closed and voice mail states to not leave a message, we did receive the fax. Fax is on DR. He's desk to sign

## 2019-02-13 ENCOUNTER — PATIENT MESSAGE (OUTPATIENT)
Dept: GASTROENTEROLOGY | Facility: CLINIC | Age: 79
End: 2019-02-13

## 2019-02-15 RX ORDER — VALSARTAN 160 MG/1
160 TABLET ORAL DAILY
Qty: 90 TABLET | Refills: 3 | Status: SHIPPED | OUTPATIENT
Start: 2019-02-15 | End: 2019-11-11 | Stop reason: RX

## 2019-02-15 NOTE — TELEPHONE ENCOUNTER
[facility], please see mychart message from pt and advise. Pt reported medication.     Hypertensive Medications  Protocol Criteria:  · Appointment scheduled in the past 6 months or in the next 3 months  · BMP or CMP in the past 12 months  · Creatinine result < 2

## 2019-02-15 NOTE — TELEPHONE ENCOUNTER
From: Marine Briscoe  To:  DELON Briceno  Sent: 2/13/2019 2:26 PM CST  Subject: Prescription Question    I had a prescription refill transferred here from [place] when I moved here: Valsartan 160 mg. I have now finished it and need a doctor autho

## 2019-02-22 ENCOUNTER — TELEPHONE (OUTPATIENT)
Dept: FAMILY MEDICINE CLINIC | Facility: CLINIC | Age: 79
End: 2019-02-22

## 2019-02-22 ENCOUNTER — MED REC SCAN ONLY (OUTPATIENT)
Dept: FAMILY MEDICINE CLINIC | Facility: CLINIC | Age: 79
End: 2019-02-22

## 2019-02-22 NOTE — TELEPHONE ENCOUNTER
Spoke with Irais Barroso, informed her we need imm records which she will try to have faxed to us. Also notified her we need Bone Density/Dexa report from previous provider.  I have left 2 messages for previous Dr. Adolph Gilbert aware she needs to schedule a 30 minute fol

## 2019-03-11 ENCOUNTER — OFFICE VISIT (OUTPATIENT)
Dept: FAMILY MEDICINE CLINIC | Facility: CLINIC | Age: 79
End: 2019-03-11
Payer: MEDICARE

## 2019-03-11 ENCOUNTER — APPOINTMENT (OUTPATIENT)
Dept: LAB | Age: 79
End: 2019-03-11
Attending: FAMILY MEDICINE
Payer: MEDICARE

## 2019-03-11 VITALS
WEIGHT: 130.63 LBS | RESPIRATION RATE: 18 BRPM | DIASTOLIC BLOOD PRESSURE: 78 MMHG | SYSTOLIC BLOOD PRESSURE: 134 MMHG | TEMPERATURE: 98 F | BODY MASS INDEX: 26 KG/M2 | HEART RATE: 78 BPM

## 2019-03-11 DIAGNOSIS — F31.70 BIPOLAR AFFECTIVE DISORDER IN REMISSION (HCC): ICD-10-CM

## 2019-03-11 DIAGNOSIS — E78.5 HYPERLIPIDEMIA, UNSPECIFIED HYPERLIPIDEMIA TYPE: ICD-10-CM

## 2019-03-11 DIAGNOSIS — I10 ESSENTIAL HYPERTENSION: Primary | ICD-10-CM

## 2019-03-11 DIAGNOSIS — I10 ESSENTIAL HYPERTENSION: ICD-10-CM

## 2019-03-11 LAB
ALBUMIN SERPL-MCNC: 4.1 G/DL (ref 3.4–5)
ALBUMIN/GLOB SERPL: 1.2 {RATIO} (ref 1–2)
ALP LIVER SERPL-CCNC: 78 U/L (ref 55–142)
ALT SERPL-CCNC: 26 U/L (ref 13–56)
ANION GAP SERPL CALC-SCNC: 7 MMOL/L (ref 0–18)
AST SERPL-CCNC: 19 U/L (ref 15–37)
BILIRUB SERPL-MCNC: 0.4 MG/DL (ref 0.1–2)
BUN BLD-MCNC: 26 MG/DL (ref 7–18)
BUN/CREAT SERPL: 36.1 (ref 10–20)
CALCIUM BLD-MCNC: 10.3 MG/DL (ref 8.5–10.1)
CHLORIDE SERPL-SCNC: 106 MMOL/L (ref 98–107)
CHOLEST SMN-MCNC: 153 MG/DL (ref ?–200)
CO2 SERPL-SCNC: 26 MMOL/L (ref 21–32)
CREAT BLD-MCNC: 0.72 MG/DL (ref 0.55–1.02)
DEPRECATED RDW RBC AUTO: 43.4 FL (ref 35.1–46.3)
ERYTHROCYTE [DISTWIDTH] IN BLOOD BY AUTOMATED COUNT: 12.8 % (ref 11–15)
GLOBULIN PLAS-MCNC: 3.4 G/DL (ref 2.8–4.4)
GLUCOSE BLD-MCNC: 102 MG/DL (ref 70–99)
HCT VFR BLD AUTO: 39.8 % (ref 35–48)
HDLC SERPL-MCNC: 46 MG/DL (ref 40–59)
HGB BLD-MCNC: 13.4 G/DL (ref 12–16)
LDLC SERPL CALC-MCNC: 83 MG/DL (ref ?–100)
M PROTEIN MFR SERPL ELPH: 7.5 G/DL (ref 6.4–8.2)
MCH RBC QN AUTO: 31.4 PG (ref 26–34)
MCHC RBC AUTO-ENTMCNC: 33.7 G/DL (ref 31–37)
MCV RBC AUTO: 93.2 FL (ref 80–100)
NONHDLC SERPL-MCNC: 107 MG/DL (ref ?–130)
OSMOLALITY SERPL CALC.SUM OF ELEC: 293 MOSM/KG (ref 275–295)
PLATELET # BLD AUTO: 322 10(3)UL (ref 150–450)
POTASSIUM SERPL-SCNC: 4.5 MMOL/L (ref 3.5–5.1)
RBC # BLD AUTO: 4.27 X10(6)UL (ref 3.8–5.3)
SODIUM SERPL-SCNC: 139 MMOL/L (ref 136–145)
TRIGL SERPL-MCNC: 118 MG/DL (ref 30–149)
VLDLC SERPL CALC-MCNC: 24 MG/DL (ref 0–30)
WBC # BLD AUTO: 8.7 X10(3) UL (ref 4–11)

## 2019-03-11 PROCEDURE — 85027 COMPLETE CBC AUTOMATED: CPT

## 2019-03-11 PROCEDURE — 99214 OFFICE O/P EST MOD 30 MIN: CPT | Performed by: FAMILY MEDICINE

## 2019-03-11 PROCEDURE — 36415 COLL VENOUS BLD VENIPUNCTURE: CPT

## 2019-03-11 PROCEDURE — G0463 HOSPITAL OUTPT CLINIC VISIT: HCPCS | Performed by: FAMILY MEDICINE

## 2019-03-11 PROCEDURE — 80053 COMPREHEN METABOLIC PANEL: CPT

## 2019-03-11 PROCEDURE — 80061 LIPID PANEL: CPT

## 2019-03-11 NOTE — PROGRESS NOTES
HPI:    Patient ID: Marine Briscoe is a 67year old female. Hyperlipidemia   This is a chronic problem. The current episode started more than 1 year ago. The problem is controlled. She has no history of chronic renal disease or diabetes.  Pertinent neg Essential hypertension  (primary encounter diagnosis)  Bipolar affective disorder in remission (hcc)  Hyperlipidemia, unspecified hyperlipidemia type     Essential hypertension–blood pressure controlled with current medication. Fasting labs done today. 31-Mar-2018 04:08

## 2019-03-13 DIAGNOSIS — E83.52 HYPERCALCEMIA: Primary | ICD-10-CM

## 2019-03-14 ENCOUNTER — APPOINTMENT (OUTPATIENT)
Dept: LAB | Age: 79
End: 2019-03-14
Attending: FAMILY MEDICINE
Payer: MEDICARE

## 2019-03-14 DIAGNOSIS — E83.52 HYPERCALCEMIA: ICD-10-CM

## 2019-03-14 LAB
ANION GAP SERPL CALC-SCNC: 7 MMOL/L (ref 0–18)
BUN BLD-MCNC: 26 MG/DL (ref 7–18)
BUN/CREAT SERPL: 41.3 (ref 10–20)
CALCIUM BLD-MCNC: 9.4 MG/DL (ref 8.5–10.1)
CHLORIDE SERPL-SCNC: 106 MMOL/L (ref 98–107)
CO2 SERPL-SCNC: 26 MMOL/L (ref 21–32)
CREAT BLD-MCNC: 0.63 MG/DL (ref 0.55–1.02)
GLUCOSE BLD-MCNC: 92 MG/DL (ref 70–99)
OSMOLALITY SERPL CALC.SUM OF ELEC: 292 MOSM/KG (ref 275–295)
POTASSIUM SERPL-SCNC: 3.7 MMOL/L (ref 3.5–5.1)
SODIUM SERPL-SCNC: 139 MMOL/L (ref 136–145)

## 2019-03-14 PROCEDURE — 80048 BASIC METABOLIC PNL TOTAL CA: CPT

## 2019-03-14 PROCEDURE — 82306 VITAMIN D 25 HYDROXY: CPT

## 2019-03-14 PROCEDURE — 82164 ANGIOTENSIN I ENZYME TEST: CPT

## 2019-03-14 PROCEDURE — 83970 ASSAY OF PARATHORMONE: CPT

## 2019-03-14 PROCEDURE — 36415 COLL VENOUS BLD VENIPUNCTURE: CPT

## 2019-03-15 LAB
25(OH)D3 SERPL-MCNC: 47 NG/ML (ref 30–100)
PTH-INTACT SERPL-MCNC: 71.5 PG/ML (ref 18.5–88)

## 2019-03-17 LAB — ANGIOTENSIN CONVERTING ENZYME: 37 U/L

## 2019-05-03 ENCOUNTER — PATIENT MESSAGE (OUTPATIENT)
Dept: FAMILY MEDICINE CLINIC | Facility: CLINIC | Age: 79
End: 2019-05-03

## 2019-05-03 NOTE — TELEPHONE ENCOUNTER
From: Melvina Montoya  To: Agustín Valverde MD  Sent: 5/3/2019 12:26 PM CDT  Subject: Referral Request    It's been a year since my last mammogram. Need one. Do you set it up, or do I?

## 2019-05-16 ENCOUNTER — PATIENT MESSAGE (OUTPATIENT)
Dept: FAMILY MEDICINE CLINIC | Facility: CLINIC | Age: 79
End: 2019-05-16

## 2019-05-16 NOTE — TELEPHONE ENCOUNTER
From: Marine Briscoe  To: Reinaldo Lobo MD  Sent: 5/16/2019 7:20 AM CDT  Subject: Prescription Question    I need authorization for refill of   prescription for generic for Lipitor. [facility] say they have contacted you with no results.    Sheeba Rosario

## 2019-05-20 ENCOUNTER — PATIENT MESSAGE (OUTPATIENT)
Dept: FAMILY MEDICINE CLINIC | Facility: CLINIC | Age: 79
End: 2019-05-20

## 2019-05-20 RX ORDER — VALSARTAN 160 MG/1
160 TABLET ORAL DAILY
Qty: 90 TABLET | Refills: 3 | OUTPATIENT
Start: 2019-05-20

## 2019-05-20 RX ORDER — ATORVASTATIN CALCIUM 10 MG/1
10 TABLET, FILM COATED ORAL NIGHTLY
Qty: 90 TABLET | Refills: 1 | Status: SHIPPED | OUTPATIENT
Start: 2019-05-20 | End: 2019-11-18

## 2019-05-20 NOTE — TELEPHONE ENCOUNTER
From: Aranza Edwards  To: Radha Berry MD  Sent: 5/20/2019 3:39 PM CDT  Subject: Prescription Question    I have just run out of two of my prescriptions: Valsartan and atorvastatin. They were originally prescribed by my doctor in [place], [place].  Roney

## 2019-05-20 NOTE — TELEPHONE ENCOUNTER
Please advise regarding pended atorvastatin request. As per patient's MyChart encounter, was previously only prescribed by previous PCP. Also routing to on-call for [facility] since pt states has been without it for a week.

## 2019-06-07 RX ORDER — ATORVASTATIN CALCIUM 10 MG/1
10 TABLET, FILM COATED ORAL NIGHTLY
Qty: 90 TABLET | Refills: 1 | OUTPATIENT
Start: 2019-06-07

## 2019-06-12 ENCOUNTER — HOSPITAL ENCOUNTER (OUTPATIENT)
Dept: MAMMOGRAPHY | Age: 79
Discharge: HOME | End: 2019-06-12
Attending: FAMILY MEDICINE
Payer: MEDICARE

## 2019-06-12 DIAGNOSIS — Z12.31 ENCOUNTER FOR SCREENING MAMMOGRAM FOR BREAST CANCER: ICD-10-CM

## 2019-06-12 PROCEDURE — 77067 SCR MAMMO BI INCL CAD: CPT | Performed by: FAMILY MEDICINE

## 2019-06-12 PROCEDURE — 77063 BREAST TOMOSYNTHESIS BI: CPT | Performed by: FAMILY MEDICINE

## 2019-09-23 ENCOUNTER — OFFICE VISIT (OUTPATIENT)
Dept: FAMILY MEDICINE CLINIC | Facility: CLINIC | Age: 79
End: 2019-09-23
Payer: MEDICARE

## 2019-09-23 VITALS
DIASTOLIC BLOOD PRESSURE: 75 MMHG | HEART RATE: 71 BPM | TEMPERATURE: 98 F | WEIGHT: 133.38 LBS | SYSTOLIC BLOOD PRESSURE: 122 MMHG | RESPIRATION RATE: 18 BRPM | BODY MASS INDEX: 26 KG/M2

## 2019-09-23 DIAGNOSIS — E78.5 HYPERLIPIDEMIA, UNSPECIFIED HYPERLIPIDEMIA TYPE: ICD-10-CM

## 2019-09-23 DIAGNOSIS — F31.70 BIPOLAR AFFECTIVE DISORDER IN REMISSION (HCC): ICD-10-CM

## 2019-09-23 DIAGNOSIS — I10 ESSENTIAL HYPERTENSION: Primary | ICD-10-CM

## 2019-09-23 PROBLEM — K21.9 GERD WITHOUT ESOPHAGITIS: Status: ACTIVE | Noted: 2019-09-23

## 2019-09-23 PROCEDURE — 99213 OFFICE O/P EST LOW 20 MIN: CPT | Performed by: FAMILY MEDICINE

## 2019-09-23 PROCEDURE — 90662 IIV NO PRSV INCREASED AG IM: CPT | Performed by: FAMILY MEDICINE

## 2019-09-23 PROCEDURE — G0008 ADMIN INFLUENZA VIRUS VAC: HCPCS | Performed by: FAMILY MEDICINE

## 2019-09-23 RX ORDER — ALPRAZOLAM 0.25 MG/1
TABLET ORAL
Refills: 2 | COMMUNITY
Start: 2019-07-14 | End: 2021-09-22 | Stop reason: ALTCHOICE

## 2019-09-23 NOTE — PROGRESS NOTES
HPI:    Patient ID: Víctor Pulido is a 67year old female. Hand Pain    This is a new problem. The current episode started more than 1 month ago. There has been a history of trauma. Hypertension   This is a chronic problem.  The current episode st regular rhythm and normal heart sounds. No murmur heard. Pulmonary/Chest: Effort normal and breath sounds normal.   Lymphadenopathy:     She has no cervical adenopathy. Neurological: She is alert and oriented to person, place, and time.    Skin: Skin i

## 2019-11-09 ENCOUNTER — TELEPHONE (OUTPATIENT)
Dept: INTERNAL MEDICINE CLINIC | Facility: CLINIC | Age: 79
End: 2019-11-09

## 2019-11-09 NOTE — TELEPHONE ENCOUNTER
Pharmacy is requesting an alternative med for Valsartan due to it being on back order and not sure when will be back on shelf, pls fax new rx with strength and dose

## 2019-11-11 RX ORDER — LOSARTAN POTASSIUM 100 MG/1
100 TABLET ORAL DAILY
Qty: 90 TABLET | Refills: 3 | Status: SHIPPED | OUTPATIENT
Start: 2019-11-11 | End: 2020-08-28

## 2019-11-11 NOTE — TELEPHONE ENCOUNTER
Spoke with patient and informed about the new medication, alternative for Valsartan, verbalized understanding.

## 2019-11-11 NOTE — TELEPHONE ENCOUNTER
Called [facility] and spoke with Herrera Leigh, states that alternative is Losartan. Dr Rosalie Sparrow =see message and advise. Medication not pended due to different dose. Medication record updated.   Please reply to carl: ANA Lombardo

## 2019-11-11 NOTE — TELEPHONE ENCOUNTER
Please let patient know that we are substituting losartan for valsartan, reassured her this is okay. She can be anxious.

## 2019-11-18 RX ORDER — ATORVASTATIN CALCIUM 10 MG/1
TABLET, FILM COATED ORAL
Qty: 90 TABLET | Refills: 1 | Status: SHIPPED | OUTPATIENT
Start: 2019-11-18 | End: 2020-05-18

## 2019-11-19 NOTE — TELEPHONE ENCOUNTER
Refill passed per [facility] protocol.   Cholesterol Medications  Protocol Criteria:  · Appointment scheduled in the past 12 months or in the next 3 months  · ALT & LDL on file in the past 12 months  · ALT result < 80  · LDL result <130   Recent Outpat

## 2020-05-18 RX ORDER — ATORVASTATIN CALCIUM 10 MG/1
TABLET, FILM COATED ORAL
Qty: 90 TABLET | Refills: 1 | Status: SHIPPED | OUTPATIENT
Start: 2020-05-18 | End: 2020-08-28

## 2020-05-18 NOTE — TELEPHONE ENCOUNTER
Please contact patient let her know I have refilled Rx but she is due for Medicare physical.  With pandemic I recommend scheduling at least 1 month from now.

## 2020-05-19 NOTE — TELEPHONE ENCOUNTER
Spoke with the patient and informed her of Dr. Ramirez Mccauley message from below. Patient voiced understanding and confirmed she will call back at another time to schedule the physical appointment.

## 2020-05-27 ENCOUNTER — NURSE TRIAGE (OUTPATIENT)
Dept: FAMILY MEDICINE CLINIC | Facility: CLINIC | Age: 80
End: 2020-05-27

## 2020-05-27 NOTE — TELEPHONE ENCOUNTER
Action Requested: Summary for Provider     []  Critical Lab, Recommendations Needed  [] Need Additional Advice  [x]   FYI    []   Need Orders  [] Need Medications Sent to Pharmacy  []  Other     SUMMARY: per patient her daughter advised her to call us as s

## 2020-08-03 ENCOUNTER — PATIENT MESSAGE (OUTPATIENT)
Dept: FAMILY MEDICINE CLINIC | Facility: CLINIC | Age: 80
End: 2020-08-03

## 2020-08-03 DIAGNOSIS — Z12.31 ENCOUNTER FOR SCREENING MAMMOGRAM FOR BREAST CANCER: Primary | ICD-10-CM

## 2020-08-03 NOTE — TELEPHONE ENCOUNTER
From: Carla Sanchez  To: Shikha Biggs MD  Sent: 8/3/2020 12:31 PM CDT  Subject: Non-Urgent Medical Question    I am two months overdue for my annual mammogram. I would like to schedule one with Dr. Sherren Papa.  Would it be possible for you to fax

## 2020-08-03 NOTE — TELEPHONE ENCOUNTER
Please let patient know order has been entered, let her know it does not need to be faxed, in system, can call for appointment

## 2020-08-05 NOTE — TELEPHONE ENCOUNTER
From  Background, Mychart To  Lucy Hyde and Delivered  8/4/2020  2:50 PM   Last Read in [address]  8/4/2020  3:16 PM by Shreya Donald

## 2020-08-20 ENCOUNTER — HOSPITAL ENCOUNTER (OUTPATIENT)
Dept: MAMMOGRAPHY | Facility: HOSPITAL | Age: 80
Discharge: HOME | End: 2020-08-20
Attending: FAMILY MEDICINE
Payer: MEDICARE

## 2020-08-20 ENCOUNTER — PATIENT MESSAGE (OUTPATIENT)
Dept: FAMILY MEDICINE CLINIC | Facility: CLINIC | Age: 80
End: 2020-08-20

## 2020-08-20 DIAGNOSIS — Z12.31 ENCOUNTER FOR SCREENING MAMMOGRAM FOR BREAST CANCER: ICD-10-CM

## 2020-08-20 PROCEDURE — 77067 SCR MAMMO BI INCL CAD: CPT | Performed by: FAMILY MEDICINE

## 2020-08-20 PROCEDURE — 77063 BREAST TOMOSYNTHESIS BI: CPT | Performed by: FAMILY MEDICINE

## 2020-08-21 NOTE — TELEPHONE ENCOUNTER
From: Estrella Betts  To: Shikha Silva MD  Sent: 8/20/2020 8:46 PM CDT  Subject: Non-Urgent Medical Question    On September 3 I have what you are saying is a follow-up appointment.  I had been scheduled for my annual physical in January, dr Saud Silva h

## 2020-08-28 RX ORDER — ATORVASTATIN CALCIUM 10 MG/1
10 TABLET, FILM COATED ORAL NIGHTLY
Qty: 90 TABLET | Refills: 3 | Status: SHIPPED | OUTPATIENT
Start: 2020-08-28 | End: 2021-12-01

## 2020-08-28 RX ORDER — LOSARTAN POTASSIUM 100 MG/1
TABLET ORAL
Qty: 90 TABLET | Refills: 3 | Status: SHIPPED | OUTPATIENT
Start: 2020-08-28 | End: 2021-12-01

## 2020-09-21 ENCOUNTER — OFFICE VISIT (OUTPATIENT)
Dept: FAMILY MEDICINE CLINIC | Facility: CLINIC | Age: 80
End: 2020-09-21
Payer: MEDICARE

## 2020-09-21 VITALS
DIASTOLIC BLOOD PRESSURE: 75 MMHG | HEIGHT: 61 IN | SYSTOLIC BLOOD PRESSURE: 122 MMHG | BODY MASS INDEX: 26.24 KG/M2 | HEART RATE: 85 BPM | WEIGHT: 139 LBS | TEMPERATURE: 98 F

## 2020-09-21 DIAGNOSIS — I10 ESSENTIAL HYPERTENSION: ICD-10-CM

## 2020-09-21 DIAGNOSIS — K21.9 GERD WITHOUT ESOPHAGITIS: ICD-10-CM

## 2020-09-21 DIAGNOSIS — S46.002A INJURY OF LEFT ROTATOR CUFF, INITIAL ENCOUNTER: ICD-10-CM

## 2020-09-21 DIAGNOSIS — Z20.822 ENCOUNTER FOR PREPROCEDURE SCREENING LABORATORY TESTING FOR COVID-19: ICD-10-CM

## 2020-09-21 DIAGNOSIS — L30.4 INTERTRIGO: ICD-10-CM

## 2020-09-21 DIAGNOSIS — Z01.812 ENCOUNTER FOR PREPROCEDURE SCREENING LABORATORY TESTING FOR COVID-19: ICD-10-CM

## 2020-09-21 DIAGNOSIS — Z00.00 ENCOUNTER FOR ANNUAL HEALTH EXAMINATION: ICD-10-CM

## 2020-09-21 DIAGNOSIS — E78.5 HYPERLIPIDEMIA, UNSPECIFIED HYPERLIPIDEMIA TYPE: ICD-10-CM

## 2020-09-21 DIAGNOSIS — Z00.00 ROUTINE PHYSICAL EXAMINATION: Primary | ICD-10-CM

## 2020-09-21 DIAGNOSIS — F31.70 BIPOLAR AFFECTIVE DISORDER IN REMISSION (HCC): ICD-10-CM

## 2020-09-21 DIAGNOSIS — M54.16 LUMBAR RADICULOPATHY: ICD-10-CM

## 2020-09-21 DIAGNOSIS — R93.89 ABNORMAL CHEST XRAY: ICD-10-CM

## 2020-09-21 LAB
ALBUMIN SERPL-MCNC: 4.3 G/DL (ref 3.4–5)
ALBUMIN/GLOB SERPL: 1.2 {RATIO} (ref 1–2)
ALP LIVER SERPL-CCNC: 61 U/L
ALT SERPL-CCNC: 33 U/L
ANION GAP SERPL CALC-SCNC: 6 MMOL/L (ref 0–18)
AST SERPL-CCNC: 25 U/L (ref 15–37)
BILIRUB SERPL-MCNC: 0.2 MG/DL (ref 0.1–2)
BUN BLD-MCNC: 24 MG/DL (ref 7–18)
BUN/CREAT SERPL: 27 (ref 10–20)
CALCIUM BLD-MCNC: 9.9 MG/DL (ref 8.5–10.1)
CHLORIDE SERPL-SCNC: 105 MMOL/L (ref 98–112)
CHOLEST SMN-MCNC: 156 MG/DL (ref ?–200)
CO2 SERPL-SCNC: 26 MMOL/L (ref 21–32)
CREAT BLD-MCNC: 0.89 MG/DL
DEPRECATED RDW RBC AUTO: 45.3 FL (ref 35.1–46.3)
ERYTHROCYTE [DISTWIDTH] IN BLOOD BY AUTOMATED COUNT: 12.8 % (ref 11–15)
GLOBULIN PLAS-MCNC: 3.5 G/DL (ref 2.8–4.4)
GLUCOSE BLD-MCNC: 92 MG/DL (ref 70–99)
HCT VFR BLD AUTO: 40.2 %
HDLC SERPL-MCNC: 44 MG/DL (ref 40–59)
HGB BLD-MCNC: 13.3 G/DL
LDLC SERPL CALC-MCNC: 68 MG/DL (ref ?–100)
M PROTEIN MFR SERPL ELPH: 7.8 G/DL (ref 6.4–8.2)
MCH RBC QN AUTO: 32.3 PG (ref 26–34)
MCHC RBC AUTO-ENTMCNC: 33.1 G/DL (ref 31–37)
MCV RBC AUTO: 97.6 FL
NONHDLC SERPL-MCNC: 112 MG/DL (ref ?–130)
OSMOLALITY SERPL CALC.SUM OF ELEC: 288 MOSM/KG (ref 275–295)
PATIENT FASTING Y/N/NP: NO
PATIENT FASTING Y/N/NP: NO
PLATELET # BLD AUTO: 340 10(3)UL (ref 150–450)
POTASSIUM SERPL-SCNC: 4.4 MMOL/L (ref 3.5–5.1)
RBC # BLD AUTO: 4.12 X10(6)UL
SODIUM SERPL-SCNC: 137 MMOL/L (ref 136–145)
TRIGL SERPL-MCNC: 221 MG/DL (ref 30–149)
TSI SER-ACNC: 1.3 MIU/ML (ref 0.36–3.74)
VLDLC SERPL CALC-MCNC: 44 MG/DL (ref 0–30)
WBC # BLD AUTO: 10.1 X10(3) UL (ref 4–11)

## 2020-09-21 PROCEDURE — G0008 ADMIN INFLUENZA VIRUS VAC: HCPCS | Performed by: FAMILY MEDICINE

## 2020-09-21 PROCEDURE — 90662 IIV NO PRSV INCREASED AG IM: CPT | Performed by: FAMILY MEDICINE

## 2020-09-21 PROCEDURE — G0439 PPPS, SUBSEQ VISIT: HCPCS | Performed by: FAMILY MEDICINE

## 2020-09-21 RX ORDER — IBUPROFEN 200 MG
200 TABLET ORAL 2 TIMES DAILY
COMMUNITY

## 2020-09-21 RX ORDER — FEXOFENADINE HCL 180 MG/1
180 TABLET ORAL DAILY
COMMUNITY

## 2020-09-21 RX ORDER — VALSARTAN 160 MG/1
160 TABLET ORAL DAILY
COMMUNITY
End: 2020-09-21 | Stop reason: ALTCHOICE

## 2020-09-21 NOTE — PATIENT INSTRUCTIONS
Alma Rodriguez's SCREENING SCHEDULE   Tests on this list are recommended by your physician but may not be covered, or covered at this frequency, by your insurer. Please check with your insurance carrier before scheduling to verify coverage.    PREVENT abnormal There are no preventive care reminders to display for this patient. Update Health Maintenance if applicable    Flex Sigmoidoscopy Screen  Covered every 5 years No results found for this or any previous visit. No flowsheet data found.      Fecal Occ (Prevnar)  Covered Once after 65 No orders found for this or any previous visit. Please get once after your 65th birthday    Pneumococcal 23 (Pneumovax)  Covered Once after 65 No orders found for this or any previous visit.  Please get once after your 65th

## 2020-09-21 NOTE — PROGRESS NOTES
HPI:   Judy Leigh is a 79year old female who presents for a Medicare Subsequent Annual Wellness visit (Pt already had Initial Annual Wellness).     Generally well  Her last annual assessment has been over 1 year: Annual Physical due on 09/21/2021 tobacco: Never Used      Tobacco comment: quit 20 to 15 yrs ago         CAGE Alcohol screening   Lakesha Clifford was screened for Alcohol abuse and had a score of 0 so is at low risk.     Patient Care Team: Patient Care Team:  Renda Schwab, MD as PCP - hypertension, High blood pressure, High cholesterol, and Hyperlipidemia. She  has a past surgical history that includes tonsillectomy; colonoscopy; and colonoscopy (N/A, 11/7/2018).     Her family history includes Breast Cancer (age of onset: 76) in her make inappropriate responses: No I avoid social activities because I cannot hear well and fear I will reply improperly: No   Family members and friends have told me they think I may have hearing loss:  Yes               Visual Acuity  Right Eye Visual Acuit RELEVANT CHRONIC CONDITIONS:   Samantha Villarreal is a 79year old female who presents for a Medicare Assessment.      PLAN SUMMARY:   Diagnoses and all orders for this visit:    Routine physical examination–patient moved here from [facility] 2 years ag assessment: good     PLAN:  The patient indicates understanding of these issues and agrees to the plan. Reinforced healthy diet, lifestyle, and exercise. Return in about 6 months (around 3/21/2021). Shikha Silva MD, 9/21/2020     [facility] 33-67, age 72 and older at high risk There are no preventive care reminders to display for this patient. Update Health Maintenance if applicable    Chlamydia  Annually if high risk No results found for: CHLAMYDIA No flowsheet data found.     Screening Mammo data found.                  Template: [employer] ANNUAL ASSESSMENT FEMALE [09055]

## 2020-09-25 ENCOUNTER — PATIENT MESSAGE (OUTPATIENT)
Dept: FAMILY MEDICINE CLINIC | Facility: CLINIC | Age: 80
End: 2020-09-25

## 2020-09-25 NOTE — TELEPHONE ENCOUNTER
From: Nieves Horta  To: Shikha Best MD  Sent: 9/25/2020 5:13 PM CDT  Subject: Non-Urgent Medical Question    [facility], [address] [facility]. Phone: [phone].

## 2020-09-29 ENCOUNTER — PATIENT MESSAGE (OUTPATIENT)
Dept: FAMILY MEDICINE CLINIC | Facility: CLINIC | Age: 80
End: 2020-09-29

## 2020-09-29 NOTE — TELEPHONE ENCOUNTER
From: Keturah Leonard  To: Shikha Burkett MD  Sent: 9/29/2020 10:58 AM CDT  Subject: Non-Urgent Medical Question    Re: Pneumonia vaccines  I just talked to Lamar Aschoff (Pharmacist) at [address], [facility].    He was ab

## 2021-03-12 DIAGNOSIS — Z23 NEED FOR VACCINATION: ICD-10-CM

## 2021-05-27 ENCOUNTER — OFFICE VISIT (OUTPATIENT)
Dept: FAMILY MEDICINE CLINIC | Facility: CLINIC | Age: 81
End: 2021-05-27
Payer: MEDICARE

## 2021-05-27 ENCOUNTER — LAB ENCOUNTER (OUTPATIENT)
Dept: LAB | Age: 81
End: 2021-05-27
Attending: FAMILY MEDICINE
Payer: MEDICARE

## 2021-05-27 VITALS
RESPIRATION RATE: 18 BRPM | BODY MASS INDEX: 25.71 KG/M2 | TEMPERATURE: 98 F | HEART RATE: 80 BPM | HEIGHT: 61 IN | WEIGHT: 136.19 LBS | SYSTOLIC BLOOD PRESSURE: 122 MMHG | DIASTOLIC BLOOD PRESSURE: 74 MMHG

## 2021-05-27 DIAGNOSIS — R00.2 PALPITATIONS: ICD-10-CM

## 2021-05-27 DIAGNOSIS — E87.6 HYPOKALEMIA: ICD-10-CM

## 2021-05-27 DIAGNOSIS — E87.6 HYPOKALEMIA: Primary | ICD-10-CM

## 2021-05-27 PROCEDURE — 80048 BASIC METABOLIC PNL TOTAL CA: CPT

## 2021-05-27 PROCEDURE — 36415 COLL VENOUS BLD VENIPUNCTURE: CPT

## 2021-05-27 PROCEDURE — 99214 OFFICE O/P EST MOD 30 MIN: CPT | Performed by: FAMILY MEDICINE

## 2021-05-27 NOTE — PROGRESS NOTES
HPI/Subjective:   Patient ID: Porfirio Davila is a [de-identified]year old female. Palpitations   The current episode started 1 to 4 weeks ago. The problem has been resolved. The symptoms are aggravated by stress.  Associated symptoms include anxiety and dizzines regular rhythm. Heart sounds: Normal heart sounds. No murmur heard. Pulmonary:      Effort: Pulmonary effort is normal.      Breath sounds: Normal breath sounds. Musculoskeletal:      Cervical back: Neck supple.    Lymphadenopathy:      Cervical:

## 2021-08-30 ENCOUNTER — PATIENT MESSAGE (OUTPATIENT)
Dept: FAMILY MEDICINE CLINIC | Facility: CLINIC | Age: 81
End: 2021-08-30

## 2021-08-30 DIAGNOSIS — Z12.31 ENCOUNTER FOR SCREENING MAMMOGRAM FOR BREAST CANCER: Primary | ICD-10-CM

## 2021-08-31 NOTE — TELEPHONE ENCOUNTER
From: Earl Mott  To: Shikha Danielson MD  Sent: 8/30/2021 11:35 AM CDT  Subject: Non-Urgent Medical Question    I got a letter recently saying I'm due for my annual mammogram. Would it be possible for you to send my orders directly to the [facility]

## 2021-09-22 ENCOUNTER — OFFICE VISIT (OUTPATIENT)
Dept: FAMILY MEDICINE CLINIC | Facility: CLINIC | Age: 81
End: 2021-09-22
Payer: MEDICARE

## 2021-09-22 ENCOUNTER — LAB ENCOUNTER (OUTPATIENT)
Dept: LAB | Age: 81
End: 2021-09-22
Attending: FAMILY MEDICINE
Payer: MEDICARE

## 2021-09-22 VITALS
HEIGHT: 61 IN | SYSTOLIC BLOOD PRESSURE: 132 MMHG | DIASTOLIC BLOOD PRESSURE: 75 MMHG | BODY MASS INDEX: 25.49 KG/M2 | RESPIRATION RATE: 18 BRPM | HEART RATE: 78 BPM | WEIGHT: 135 LBS | TEMPERATURE: 97 F

## 2021-09-22 DIAGNOSIS — K21.9 GERD WITHOUT ESOPHAGITIS: ICD-10-CM

## 2021-09-22 DIAGNOSIS — Z00.00 ROUTINE PHYSICAL EXAMINATION: Primary | ICD-10-CM

## 2021-09-22 DIAGNOSIS — R32 URINARY INCONTINENCE, UNSPECIFIED TYPE: ICD-10-CM

## 2021-09-22 DIAGNOSIS — E78.5 HYPERLIPIDEMIA, UNSPECIFIED HYPERLIPIDEMIA TYPE: ICD-10-CM

## 2021-09-22 DIAGNOSIS — R19.8 GASTROINTESTINAL SYMPTOM: ICD-10-CM

## 2021-09-22 DIAGNOSIS — Z00.00 ENCOUNTER FOR ANNUAL HEALTH EXAMINATION: ICD-10-CM

## 2021-09-22 DIAGNOSIS — M54.16 LUMBAR RADICULOPATHY: ICD-10-CM

## 2021-09-22 DIAGNOSIS — F31.70 BIPOLAR AFFECTIVE DISORDER IN REMISSION (HCC): ICD-10-CM

## 2021-09-22 DIAGNOSIS — I10 ESSENTIAL HYPERTENSION: ICD-10-CM

## 2021-09-22 DIAGNOSIS — H91.90 HEARING LOSS, UNSPECIFIED HEARING LOSS TYPE, UNSPECIFIED LATERALITY: ICD-10-CM

## 2021-09-22 PROBLEM — M81.0 AGE-RELATED OSTEOPOROSIS WITHOUT CURRENT PATHOLOGICAL FRACTURE: Status: ACTIVE | Noted: 2021-09-22

## 2021-09-22 LAB
ALBUMIN SERPL-MCNC: 4 G/DL (ref 3.4–5)
ALBUMIN/GLOB SERPL: 1.2 {RATIO} (ref 1–2)
ALP LIVER SERPL-CCNC: 60 U/L
ALT SERPL-CCNC: 25 U/L
ANION GAP SERPL CALC-SCNC: 6 MMOL/L (ref 0–18)
AST SERPL-CCNC: 20 U/L (ref 15–37)
BILIRUB SERPL-MCNC: 0.4 MG/DL (ref 0.1–2)
BILIRUB UR QL: NEGATIVE
BUN BLD-MCNC: 19 MG/DL (ref 7–18)
BUN/CREAT SERPL: 26 (ref 10–20)
CALCIUM BLD-MCNC: 9.1 MG/DL (ref 8.5–10.1)
CHLORIDE SERPL-SCNC: 110 MMOL/L (ref 98–112)
CHOLEST SERPL-MCNC: 148 MG/DL (ref ?–200)
CLARITY UR: CLEAR
CO2 SERPL-SCNC: 24 MMOL/L (ref 21–32)
COLOR UR: YELLOW
CREAT BLD-MCNC: 0.73 MG/DL
DEPRECATED RDW RBC AUTO: 44.2 FL (ref 35.1–46.3)
ERYTHROCYTE [DISTWIDTH] IN BLOOD BY AUTOMATED COUNT: 12.5 % (ref 11–15)
GLOBULIN PLAS-MCNC: 3.4 G/DL (ref 2.8–4.4)
GLUCOSE BLD-MCNC: 101 MG/DL (ref 70–99)
GLUCOSE UR-MCNC: NEGATIVE MG/DL
HCT VFR BLD AUTO: 39 %
HDLC SERPL-MCNC: 45 MG/DL (ref 40–59)
HGB BLD-MCNC: 12.7 G/DL
HGB UR QL STRIP.AUTO: NEGATIVE
KETONES UR-MCNC: NEGATIVE MG/DL
LDLC SERPL CALC-MCNC: 77 MG/DL (ref ?–100)
LIPASE SERPL-CCNC: 156 U/L (ref 73–393)
MCH RBC QN AUTO: 31.7 PG (ref 26–34)
MCHC RBC AUTO-ENTMCNC: 32.6 G/DL (ref 31–37)
MCV RBC AUTO: 97.3 FL
NITRITE UR QL STRIP.AUTO: NEGATIVE
NONHDLC SERPL-MCNC: 103 MG/DL (ref ?–130)
OSMOLALITY SERPL CALC.SUM OF ELEC: 292 MOSM/KG (ref 275–295)
PATIENT FASTING Y/N/NP: NO
PATIENT FASTING Y/N/NP: NO
PH UR: 5 [PH] (ref 5–8)
PLATELET # BLD AUTO: 303 10(3)UL (ref 150–450)
POTASSIUM SERPL-SCNC: 4 MMOL/L (ref 3.5–5.1)
PROT SERPL-MCNC: 7.4 G/DL (ref 6.4–8.2)
PROT UR-MCNC: 30 MG/DL
RBC # BLD AUTO: 4.01 X10(6)UL
SODIUM SERPL-SCNC: 140 MMOL/L (ref 136–145)
SP GR UR STRIP: 1.02 (ref 1–1.03)
TRIGL SERPL-MCNC: 147 MG/DL (ref 30–149)
UROBILINOGEN UR STRIP-ACNC: <2
VLDLC SERPL CALC-MCNC: 23 MG/DL (ref 0–30)
WBC # BLD AUTO: 8.3 X10(3) UL (ref 4–11)

## 2021-09-22 PROCEDURE — 83690 ASSAY OF LIPASE: CPT

## 2021-09-22 PROCEDURE — G0439 PPPS, SUBSEQ VISIT: HCPCS | Performed by: FAMILY MEDICINE

## 2021-09-22 PROCEDURE — 87086 URINE CULTURE/COLONY COUNT: CPT

## 2021-09-22 PROCEDURE — 80061 LIPID PANEL: CPT

## 2021-09-22 PROCEDURE — G0008 ADMIN INFLUENZA VIRUS VAC: HCPCS | Performed by: FAMILY MEDICINE

## 2021-09-22 PROCEDURE — 36415 COLL VENOUS BLD VENIPUNCTURE: CPT

## 2021-09-22 PROCEDURE — 81001 URINALYSIS AUTO W/SCOPE: CPT

## 2021-09-22 PROCEDURE — 90662 IIV NO PRSV INCREASED AG IM: CPT | Performed by: FAMILY MEDICINE

## 2021-09-22 PROCEDURE — 85027 COMPLETE CBC AUTOMATED: CPT

## 2021-09-22 PROCEDURE — 80053 COMPREHEN METABOLIC PANEL: CPT

## 2021-09-22 NOTE — PROGRESS NOTES
HPI:   Oscar Mayer is a [de-identified]year old female who presents for a Medicare Subsequent Annual Wellness visit (Pt already had Initial Annual Wellness).     Generally well  Her last annual assessment has been over 1 year: Annual Physical due on 09/22/2022 Use      Smoking status: Former Smoker      Smokeless tobacco: Never Used      Tobacco comment: quit 20 to 15 yrs ago         CAGE screening score of 0 on 9/21/2021, showing low risk of alcohol abuse.         Patient Care Team: Patient Care Team:  Kelin Fischer cholesterol, and Hyperlipidemia. She  has a past surgical history that includes tonsillectomy; colonoscopy; and colonoscopy (N/A, 11/7/2018). Her family history includes Breast Cancer (age of onset: 76) in her maternal aunt;  Other in her father and m rhythm, S1 and S2 normal, no murmur, rub, or gallop   Abdomen:   Soft, non-tender, bowel sounds active all four quadrants,  no masses, no organomegaly   Pelvic: Deferred   Extremities: Extremities normal, atraumatic, no cyanosis or edema   Pulses: 2+ and s PANEL (14); Future  -     CBC, PLATELET; NO DIFFERENTIAL; Future  -     LIPID PANEL;  Future  -     URINALYSIS, ROUTINE; Future  -     URINE CULTURE, ROUTINE; Future    Lumbar radiculopathy–recently stable–    Bipolar affective disorder in remission (HCC)–c Light  How would you describe your current health state?: Fair  How do you maintain positive mental well-being?: Social Interaction; Games;  Visiting Family     Supplementary Documentation:   Bartol[facility] Rodriguez's SCREENING SCHEDULE   Tests on this list are with risk of osteoporosis or estrogen deficiency. Covered yearly for long-term glucocorticoid medication use (Steroids) No results found for this or any previous visit.       DEXA Scan Never done   Pap and Pelvic    Pap   Covered every 2 years for women

## 2021-09-22 NOTE — PATIENT INSTRUCTIONS
Alma Rodriguez's SCREENING SCHEDULE   Tests on this list are recommended by your physician but may not be covered, or covered at this frequency, by your insurer. Please check with your insurance carrier before scheduling to verify coverage.    Fartun Lauren Never done   Pap and Pelvic    Pap   Covered every 2 years for women at normal risk;  Annually if at high risk -  No recommendations at this time    Chlamydia Annually if high risk -  No recommendations at this time   Screening Mammogram    Mammogram     Re site has a lot of good information including definitions of the different types of Advance Directives.  It also has the State forms available on it's website for anyone to review and print using their home computer and printer. (the forms are also available

## 2021-10-06 ENCOUNTER — HOSPITAL ENCOUNTER (OUTPATIENT)
Dept: MAMMOGRAPHY | Age: 81
Discharge: HOME | End: 2021-10-06
Attending: FAMILY MEDICINE
Payer: MEDICARE

## 2021-10-06 DIAGNOSIS — Z12.31 ENCOUNTER FOR SCREENING MAMMOGRAM FOR BREAST CANCER: ICD-10-CM

## 2021-10-06 PROCEDURE — 77067 SCR MAMMO BI INCL CAD: CPT | Performed by: FAMILY MEDICINE

## 2021-10-06 PROCEDURE — 77063 BREAST TOMOSYNTHESIS BI: CPT | Performed by: FAMILY MEDICINE

## 2021-10-17 ENCOUNTER — TELEPHONE (OUTPATIENT)
Dept: FAMILY MEDICINE CLINIC | Facility: CLINIC | Age: 81
End: 2021-10-17

## 2021-10-17 NOTE — TELEPHONE ENCOUNTER
----- Message from Corey Bowman sent at 10/16/2021  9:22 PM CDT -----  Regarding: Sciatic pain  I have sciatica from lumbar arthritis. Recently it has gotten much worse.  Because the pain is often diffuse, I am concerned that I have arthritis in my r

## 2021-10-18 NOTE — TELEPHONE ENCOUNTER
Please let her she does not need referral to see Dr. Yanely Sanchez. If elliptical is not causing apparent increase in pain at the time of use, then okay to continue until she has appointment.

## 2021-12-01 RX ORDER — LOSARTAN POTASSIUM 100 MG/1
100 TABLET ORAL DAILY
Qty: 90 TABLET | Refills: 1 | Status: SHIPPED | OUTPATIENT
Start: 2021-12-01 | End: 2022-05-19

## 2021-12-01 RX ORDER — ATORVASTATIN CALCIUM 10 MG/1
10 TABLET, FILM COATED ORAL NIGHTLY
Qty: 90 TABLET | Refills: 1 | Status: SHIPPED | OUTPATIENT
Start: 2021-12-01 | End: 2022-05-19

## 2021-12-01 NOTE — TELEPHONE ENCOUNTER
[facility]. Requested Prescriptions   Pending Prescriptions Disp Refills    losartan 100 MG Oral Tab 90 tablet 3     Sig: Take 1 tablet (100 mg total) by mouth daily. Hypertensive Medications Protocol Passed - 12/1/2021  9:46 AM        Passed - CMP or BMP in past 12 months        Passed - Appointment in past 6 or next 3 months        Passed - GFR Non- > 50     Lab Results   Component Value Date    GFRNAA 78 09/22/2021                    atorvastatin 10 MG Oral Tab 90 tablet 3     Sig: Take 1 tablet (10 mg total) by mouth nightly.         Cholesterol Medication Protocol Passed - 12/1/2021  9:46 AM        Passed - ALT in past 12 months        Passed - LDL in past 12 months        Passed - Last ALT < 80       Lab Results   Component Value Date    ALT 25 09/22/2021             Passed - Last LDL < 130     Lab Results   Component Value Date    LDL 77 09/22/2021               Passed - Appointment in past 12 or next 3 months                   Recent Outpatient Visits              2 months ago Routine physical examination    [facility], Nella Goddard, Fide Olvera MD    Office Visit    6 months ago Hypokalemia    [facility], Nella Goddard, [facility] Fide goodman MD    Office Visit    1 year ago Routine physical examination    [facility], Shaina Cesar Carmela Motts, MD    Office Visit    2 years ago Essential hypertension    [facility], N[facility], Fide Olvera MD    Office Visit    2 years ago Essential hypertension    [facility], Tessarobert Goddard, Linda Victor MD    Office Visit

## 2022-01-26 ENCOUNTER — PATIENT MESSAGE (OUTPATIENT)
Dept: FAMILY MEDICINE CLINIC | Facility: CLINIC | Age: 82
End: 2022-01-26

## 2022-01-26 ENCOUNTER — NURSE TRIAGE (OUTPATIENT)
Dept: FAMILY MEDICINE CLINIC | Facility: CLINIC | Age: 82
End: 2022-01-26

## 2022-01-26 LAB — AMB EXT COVID-19 RESULT: DETECTED

## 2022-01-27 ENCOUNTER — HOSPITAL ENCOUNTER (OUTPATIENT)
Age: 82
Discharge: HOME | End: 2022-01-27
Attending: EMERGENCY MEDICINE
Payer: MEDICARE

## 2022-01-27 ENCOUNTER — TELEPHONE (OUTPATIENT)
Dept: FAMILY MEDICINE CLINIC | Facility: CLINIC | Age: 82
End: 2022-01-27

## 2022-01-27 VITALS
DIASTOLIC BLOOD PRESSURE: 65 MMHG | RESPIRATION RATE: 18 BRPM | OXYGEN SATURATION: 99 % | HEART RATE: 78 BPM | TEMPERATURE: 98 F | SYSTOLIC BLOOD PRESSURE: 134 MMHG

## 2022-01-27 DIAGNOSIS — U07.1 COVID-19: Primary | ICD-10-CM

## 2022-01-27 PROCEDURE — 99213 OFFICE O/P EST LOW 20 MIN: CPT

## 2022-01-27 PROCEDURE — 99214 OFFICE O/P EST MOD 30 MIN: CPT

## 2022-01-27 NOTE — TELEPHONE ENCOUNTER
Pt received MAB infusion at [address] [place] [address] on 01/27 for COVID-19. Please follow-up with pt for post-infusion assessment and home monitoring if needed. Thank you.

## 2022-01-27 NOTE — ED INITIAL ASSESSMENT (HPI)
Pt came in due to cough, congestion, runny nose, and body aches with fatigue for the past 5 days. pt stated she tested positive for covid yesterday, and was told by her PCP to come in for antibody infusion.  Pt denies any sob, cp, nvd, ha, fever, or any diz

## 2022-01-27 NOTE — ED PROVIDER NOTES
Patient Seen in: [facility]      History   Patient presents with:  Cough    Stated Complaint: Infussion cough ha loss of taste and smell    Subjective:   HPI    The patient is an 28-year-old female with past history of hypertension, COPD, hyp and rhythm without murmur  Abdomen: Soft, nontender and nondistended  Neurologic: Patient is awake, alert and oriented ×3.   The patient's motor strength is 5 out of 5 and symmetric in the upper and lower extremities bilaterally  Extremities: No focal swell Discharge Medication List

## 2022-01-28 NOTE — TELEPHONE ENCOUNTER
Home Monitoring Day 1 of 1 s/p MAB infusion on 1/27/22    What  was your temp today? - 100 F (last night)    How did you take your temp?     with an oral thermometer    What was your pulse ox today?  99 % room air    Are you feeling short of breath today? No      Is the shortness of breath better, the same, or worse than yesterday?   about the same    Are you having a cough today? Yes    Is the cough better, the same, or worse than yesterday?    about the same    Are you experiencing weakness today? Yes    Is the weakness better, the same or worse than yesterday?     about the same    How is your appetite compared to yesterday? A little better     Are you vomiting? No    Are you experiencing diarrhea? No    Any loss of taste or smell? Yes: loss of taste/smell      Nursing notes:  A lot of nasal congestion. Advised patient to use humidifier, steam in the bathroom and sit in bathroom for a little bit to ease nasal congestion.

## 2022-03-10 ENCOUNTER — MED REC SCAN ONLY (OUTPATIENT)
Dept: FAMILY MEDICINE CLINIC | Facility: CLINIC | Age: 82
End: 2022-03-10

## 2022-04-05 ENCOUNTER — LAB ENCOUNTER (OUTPATIENT)
Dept: LAB | Age: 82
End: 2022-04-05
Attending: FAMILY MEDICINE
Payer: MEDICARE

## 2022-04-05 ENCOUNTER — TELEPHONE (OUTPATIENT)
Dept: FAMILY MEDICINE CLINIC | Facility: CLINIC | Age: 82
End: 2022-04-05

## 2022-04-05 DIAGNOSIS — R30.0 DYSURIA: ICD-10-CM

## 2022-04-05 LAB
BILIRUB UR QL: NEGATIVE
COLOR UR: YELLOW
GLUCOSE UR-MCNC: NEGATIVE MG/DL
KETONES UR-MCNC: NEGATIVE MG/DL
NITRITE UR QL STRIP.AUTO: NEGATIVE
PH UR: 7 [PH] (ref 5–8)
PROT UR-MCNC: 100 MG/DL
RBC #/AREA URNS AUTO: >10 /HPF
SP GR UR STRIP: 1.01 (ref 1–1.03)
UROBILINOGEN UR STRIP-ACNC: <2
VIT C UR-MCNC: NEGATIVE MG/DL
WBC #/AREA URNS AUTO: >50 /HPF
WBC CLUMPS UR QL AUTO: PRESENT /HPF

## 2022-04-05 PROCEDURE — 87077 CULTURE AEROBIC IDENTIFY: CPT

## 2022-04-05 PROCEDURE — 87186 SC STD MICRODIL/AGAR DIL: CPT

## 2022-04-05 PROCEDURE — 81001 URINALYSIS AUTO W/SCOPE: CPT

## 2022-04-05 PROCEDURE — 87086 URINE CULTURE/COLONY COUNT: CPT

## 2022-04-05 NOTE — TELEPHONE ENCOUNTER
Patient states she was treated last week with an antibiotic for a urinary tract infection, and now symptoms have returned. Patient states she has burning upon urination, urgency and frequency. She denies fever, abdominal pain,back pain or blood in urine. Patient asking if instead of office visit, if she can provide urine sample. Her daughter is able to take her this afternoon. Please advise.

## 2022-04-07 ENCOUNTER — PATIENT MESSAGE (OUTPATIENT)
Dept: FAMILY MEDICINE CLINIC | Facility: CLINIC | Age: 82
End: 2022-04-07

## 2022-04-07 RX ORDER — CIPROFLOXACIN 250 MG/1
250 TABLET, FILM COATED ORAL 2 TIMES DAILY
Qty: 14 TABLET | Refills: 0 | Status: SHIPPED | OUTPATIENT
Start: 2022-04-07 | End: 2022-04-14

## 2022-04-28 ENCOUNTER — TELEPHONE (OUTPATIENT)
Dept: FAMILY MEDICINE CLINIC | Facility: CLINIC | Age: 82
End: 2022-04-28

## 2022-04-28 ENCOUNTER — PATIENT MESSAGE (OUTPATIENT)
Dept: FAMILY MEDICINE CLINIC | Facility: CLINIC | Age: 82
End: 2022-04-28

## 2022-04-28 RX ORDER — NITROFURANTOIN 25; 75 MG/1; MG/1
100 CAPSULE ORAL 2 TIMES DAILY
Qty: 10 CAPSULE | Refills: 0 | Status: SHIPPED | OUTPATIENT
Start: 2022-04-28 | End: 2022-05-03

## 2022-04-28 NOTE — TELEPHONE ENCOUNTER
Patient states she has been treated twice recently for urinary tract infection and symptoms have returned this morning. Burning upon urination, urgency, bladder spasms and lower abdominal pain. No fever. Patient recently finished prescription for Cipro. Patient also states she also is supposed to have a 3 hour dental implant surgery tomorrow. Please advise.

## 2022-04-28 NOTE — TELEPHONE ENCOUNTER
Called patient, confirmed name and . Advised of below. States she will schedule US and then call back to schedule with Dr. Irena Velazquez.

## 2022-04-28 NOTE — TELEPHONE ENCOUNTER
Please let patient know I have sent Rx to pharmacy for antibiotic. This should not interact with any antibiotic given by the dentist which she should take in addition. Drink a lot of fluids. Try to get 2 doses of antibiotic today. Since this is her third infection within a year I recommend follow-up appointment with me in 3 to 4 weeks (okay to use res24) and ultrasound kidney and bladder as ordered shortly before visit.

## 2022-05-13 ENCOUNTER — HOSPITAL ENCOUNTER (OUTPATIENT)
Dept: ULTRASOUND IMAGING | Age: 82
Discharge: HOME | End: 2022-05-13
Attending: FAMILY MEDICINE
Payer: MEDICARE

## 2022-05-13 DIAGNOSIS — N39.0 RECURRENT UTI: ICD-10-CM

## 2022-05-13 PROCEDURE — 76770 US EXAM ABDO BACK WALL COMP: CPT | Performed by: FAMILY MEDICINE

## 2022-05-16 ENCOUNTER — TELEPHONE (OUTPATIENT)
Dept: FAMILY MEDICINE CLINIC | Facility: CLINIC | Age: 82
End: 2022-05-16

## 2022-05-16 ENCOUNTER — PATIENT MESSAGE (OUTPATIENT)
Dept: FAMILY MEDICINE CLINIC | Facility: CLINIC | Age: 82
End: 2022-05-16

## 2022-05-16 DIAGNOSIS — R30.0 DYSURIA: Primary | ICD-10-CM

## 2022-05-17 RX ORDER — NITROFURANTOIN 25; 75 MG/1; MG/1
100 CAPSULE ORAL 2 TIMES DAILY
Qty: 10 CAPSULE | Refills: 0 | Status: SHIPPED | OUTPATIENT
Start: 2022-05-17 | End: 2022-05-24

## 2022-05-17 NOTE — TELEPHONE ENCOUNTER
----- Message from Nona Brenner sent at 5/16/2022  4:59 PM CDT -----  Regarding: UTI again  I have new or old UTI and want to know if I should of come into lab tomorrow early afternoon for sp cinema or will you order me another prescription?   Estee Chapin

## 2022-05-17 NOTE — TELEPHONE ENCOUNTER
Called patient, confirmed name and . Patient states that she picked up the [address] but can't get a ride to provide a specimen today or tomorrow. Please advise.       FYI:  Future Appointments   Date Time Provider Zhang Lauren   6/10/2022 11:00 AM Richelle Kirkpatrick  [address] [place] [address]

## 2022-05-17 NOTE — TELEPHONE ENCOUNTER
See condition update 5/16/22. From: Lamonte Sanchez  To: Shikha Corona MD  Sent: 5/16/2022  4:59 PM CDT  Subject: UTI again    I have new or old UTI and want to know if I should of come into lab tomorrow early afternoon for sp cinema or will you order me another prescription?   Dock Alias

## 2022-05-17 NOTE — TELEPHONE ENCOUNTER
Spoke with patient ( verified) and relayed Dr. Courtney Mistry message below--patient verbalizes understanding and agreement. Patient agreeable to see you sooner than 6/10/2022. Can res24 or TCM or other appt slots be used, or ok to wait until scheduled appt?     Future Appointments   Date Time Provider Zhang Aguilar   6/10/2022 11:00 AM Marilee Morales MD [address] [place] [address]

## 2022-05-17 NOTE — TELEPHONE ENCOUNTER
Patient calling stating she has recurrent UTI and would like another UA with culture and antibiotics sent to her pharmacy. Please advise.

## 2022-05-17 NOTE — TELEPHONE ENCOUNTER
Okay, go ahead and take medication. No need to do culture as will be invalid after starting antibiotic. I still recommend follow-up appointment with me.

## 2022-05-17 NOTE — TELEPHONE ENCOUNTER
Please let patient know orders entered. Start antibiotic after providing specimen. I have extended the Macrobid to 7 days. Make follow-up appointment with me to discuss recurrent UTIs in 2 to 3 weeks, okay to give res24 if needed.

## 2022-05-19 RX ORDER — LOSARTAN POTASSIUM 100 MG/1
TABLET ORAL
Qty: 90 TABLET | Refills: 1 | Status: SHIPPED | OUTPATIENT
Start: 2022-05-19

## 2022-05-19 RX ORDER — ATORVASTATIN CALCIUM 10 MG/1
TABLET, FILM COATED ORAL
Qty: 90 TABLET | Refills: 1 | Status: SHIPPED | OUTPATIENT
Start: 2022-05-19

## 2022-05-19 NOTE — TELEPHONE ENCOUNTER
Refill passed per [facility] protocol.   Requested Prescriptions   Pending Prescriptions Disp Refills    LOSARTAN 100 MG Oral Tab [Pharmacy Med Name: LOSARTAN 100MG TABLETS] 90 tablet 1     Sig: TAKE 1 TABLET(100 MG) BY MOUTH DAILY        Hypertensive Medications Protocol Passed - 5/19/2022  5:46 AM        Passed - CMP or BMP in past 12 months        Passed - Appointment in past 6 or next 3 months        Passed - GFR Non- > 50     Lab Results   Component Value Date    GFRNAA 78 09/22/2021                    ATORVASTATIN 10 MG Oral Tab [Pharmacy Med Name: ATORVASTATIN 10MG TABLETS] 90 tablet 1     Sig: TAKE 1 TABLET(10 MG) BY MOUTH EVERY NIGHT        Cholesterol Medication Protocol Passed - 5/19/2022  5:46 AM        Passed - ALT in past 12 months        Passed - LDL in past 12 months        Passed - Last ALT < 80       Lab Results   Component Value Date    ALT 25 09/22/2021             Passed - Last LDL < 130     Lab Results   Component Value Date    LDL 77 09/22/2021               Passed - Appointment in past 12 or next 3 months             Recent Outpatient Visits              7 months ago Routine physical examination    [address] Shaina Melara Ida Penner, MD    Office Visit    11 months ago [facility]da [facility]    Office Visit    1 year ago Routine physical examination    [facility] Tripathi Ida Penner, [facility]    Office Visit    2 years ago Essential hypertension    [facility], Shaina Tripathi Ida Penner, MD    Office Visit    3 years ago Essential hypertension    [facility], Shaina Tripathi Ida Penner, MD    Office Visit          Future Appointments         Provider Department Appt Notes    In 3 weeks Marilee Morales MD [facility], Linda Tripathi on ultrasound

## 2022-06-07 ENCOUNTER — OFFICE VISIT (OUTPATIENT)
Dept: FAMILY MEDICINE CLINIC | Facility: CLINIC | Age: 82
End: 2022-06-07
Payer: MEDICARE

## 2022-06-07 VITALS
SYSTOLIC BLOOD PRESSURE: 140 MMHG | WEIGHT: 133 LBS | OXYGEN SATURATION: 99 % | BODY MASS INDEX: 26.11 KG/M2 | TEMPERATURE: 99 F | DIASTOLIC BLOOD PRESSURE: 66 MMHG | HEIGHT: 60 IN | HEART RATE: 81 BPM

## 2022-06-07 DIAGNOSIS — N39.0 RECURRENT UTI: Primary | ICD-10-CM

## 2022-06-07 PROCEDURE — 99214 OFFICE O/P EST MOD 30 MIN: CPT | Performed by: FAMILY MEDICINE

## 2022-06-07 RX ORDER — TITANIUM DIOXIDE, OCTINOXATE, ZINC OXIDE 4.61; 1.6; .78 G/40ML; G/40ML; G/40ML
CREAM TOPICAL
Qty: 60 CAPSULE | Refills: 5 | COMMUNITY

## 2022-09-14 ENCOUNTER — TELEPHONE (OUTPATIENT)
Dept: FAMILY MEDICINE CLINIC | Facility: CLINIC | Age: 82
End: 2022-09-14

## 2022-09-14 NOTE — TELEPHONE ENCOUNTER
Per Dr. Merino Bone request called patient, confirmed name and . Informed her of her bone density result and she confirms that she is still seeing Dr. Nicole Garcia for Prolia. Result faxed to Dr. Nicole Garcia by [facility]. Patient explains that she was in the ER on  for GERD and other GI symptoms.  Assisted to schedule follow-up visit:    Future Appointments   Date Time Provider Zhang Aguilar   9/15/2022  2:15 PM Radha Zapata MD [facility]   11/10/2022  1:30 PM Radha Zapata MD [address] [place] [address]

## 2022-09-15 ENCOUNTER — OFFICE VISIT (OUTPATIENT)
Dept: FAMILY MEDICINE CLINIC | Facility: CLINIC | Age: 82
End: 2022-09-15
Payer: MEDICARE

## 2022-09-15 VITALS
WEIGHT: 129 LBS | BODY MASS INDEX: 26 KG/M2 | SYSTOLIC BLOOD PRESSURE: 123 MMHG | HEART RATE: 87 BPM | HEIGHT: 59 IN | DIASTOLIC BLOOD PRESSURE: 65 MMHG | OXYGEN SATURATION: 98 % | RESPIRATION RATE: 17 BRPM

## 2022-09-15 DIAGNOSIS — R94.31 ABNORMAL EKG: ICD-10-CM

## 2022-09-15 DIAGNOSIS — K44.9 HIATAL HERNIA: ICD-10-CM

## 2022-09-15 DIAGNOSIS — K21.9 GASTROESOPHAGEAL REFLUX DISEASE, UNSPECIFIED WHETHER ESOPHAGITIS PRESENT: Primary | ICD-10-CM

## 2022-09-15 DIAGNOSIS — Z12.31 ENCOUNTER FOR SCREENING MAMMOGRAM FOR BREAST CANCER: ICD-10-CM

## 2022-09-15 PROCEDURE — 99214 OFFICE O/P EST MOD 30 MIN: CPT | Performed by: FAMILY MEDICINE

## 2022-09-15 RX ORDER — MAGNESIUM HYDROXIDE/ALUMINUM HYDROXICE/SIMETHICONE 120; 1200; 1200 MG/30ML; MG/30ML; MG/30ML
30 SUSPENSION ORAL
COMMUNITY
Start: 2022-09-06

## 2022-09-15 RX ORDER — ESOMEPRAZOLE MAGNESIUM 40 MG/1
40 FOR SUSPENSION ORAL
Qty: 90 EACH | Refills: 3 | Status: SHIPPED | OUTPATIENT
Start: 2022-09-15

## 2022-09-19 ENCOUNTER — TELEPHONE (OUTPATIENT)
Dept: FAMILY MEDICINE CLINIC | Facility: CLINIC | Age: 82
End: 2022-09-19

## 2022-09-19 NOTE — TELEPHONE ENCOUNTER
Esomeprazole Magnesium 40 MG Oral Powd Pack requires a prior authorization.      Per pharmacy, patient ID # [id number]  Phone number - [phone] for [place]

## 2022-11-10 ENCOUNTER — LAB ENCOUNTER (OUTPATIENT)
Dept: LAB | Age: 82
End: 2022-11-10
Attending: FAMILY MEDICINE
Payer: MEDICARE

## 2022-11-10 ENCOUNTER — OFFICE VISIT (OUTPATIENT)
Dept: FAMILY MEDICINE CLINIC | Facility: CLINIC | Age: 82
End: 2022-11-10
Payer: MEDICARE

## 2022-11-10 VITALS
BODY MASS INDEX: 26.26 KG/M2 | WEIGHT: 132 LBS | DIASTOLIC BLOOD PRESSURE: 74 MMHG | HEIGHT: 59.45 IN | HEART RATE: 79 BPM | SYSTOLIC BLOOD PRESSURE: 136 MMHG | TEMPERATURE: 98 F

## 2022-11-10 DIAGNOSIS — M81.0 AGE-RELATED OSTEOPOROSIS WITHOUT CURRENT PATHOLOGICAL FRACTURE: ICD-10-CM

## 2022-11-10 DIAGNOSIS — N39.0 RECURRENT UTI: ICD-10-CM

## 2022-11-10 DIAGNOSIS — F31.70 BIPOLAR AFFECTIVE DISORDER IN REMISSION (HCC): ICD-10-CM

## 2022-11-10 DIAGNOSIS — I10 ESSENTIAL HYPERTENSION: ICD-10-CM

## 2022-11-10 DIAGNOSIS — E78.5 HYPERLIPIDEMIA, UNSPECIFIED HYPERLIPIDEMIA TYPE: ICD-10-CM

## 2022-11-10 DIAGNOSIS — K21.9 GERD WITHOUT ESOPHAGITIS: ICD-10-CM

## 2022-11-10 DIAGNOSIS — Z00.00 ENCOUNTER FOR ANNUAL HEALTH EXAMINATION: Primary | ICD-10-CM

## 2022-11-10 PROBLEM — M54.16 LUMBAR RADICULOPATHY: Status: RESOLVED | Noted: 2018-10-01 | Resolved: 2022-11-10

## 2022-11-10 LAB
BILIRUB UR QL: NEGATIVE
CHOLEST SERPL-MCNC: 155 MG/DL (ref ?–200)
CLARITY UR: CLEAR
COLOR UR: YELLOW
FASTING PATIENT LIPID ANSWER: NO
GLUCOSE UR-MCNC: NEGATIVE MG/DL
HDLC SERPL-MCNC: 43 MG/DL (ref 40–59)
HGB UR QL STRIP.AUTO: NEGATIVE
KETONES UR-MCNC: NEGATIVE MG/DL
LDLC SERPL CALC-MCNC: 48 MG/DL (ref ?–100)
NITRITE UR QL STRIP.AUTO: NEGATIVE
NONHDLC SERPL-MCNC: 112 MG/DL (ref ?–130)
PH UR: 7 [PH] (ref 5–8)
PROT UR-MCNC: 30 MG/DL
SP GR UR STRIP: 1.02 (ref 1–1.03)
TRIGL SERPL-MCNC: 437 MG/DL (ref 30–149)
UROBILINOGEN UR STRIP-ACNC: <2
VIT C UR-MCNC: 20 MG/DL
VLDLC SERPL CALC-MCNC: 62 MG/DL (ref 0–30)

## 2022-11-10 PROCEDURE — 81001 URINALYSIS AUTO W/SCOPE: CPT

## 2022-11-10 PROCEDURE — 80061 LIPID PANEL: CPT

## 2022-11-10 PROCEDURE — 36415 COLL VENOUS BLD VENIPUNCTURE: CPT

## 2022-11-10 PROCEDURE — 87086 URINE CULTURE/COLONY COUNT: CPT

## 2022-11-10 RX ORDER — ACETAMINOPHEN 325 MG/1
TABLET ORAL
COMMUNITY
Start: 2022-09-15

## 2022-11-14 ENCOUNTER — HOSPITAL ENCOUNTER (OUTPATIENT)
Dept: CT IMAGING | Facility: HOSPITAL | Age: 82
Discharge: HOME | End: 2022-11-14
Attending: INTERNAL MEDICINE
Payer: MEDICARE

## 2022-11-14 VITALS
HEIGHT: 60 IN | HEART RATE: 65 BPM | RESPIRATION RATE: 10 BRPM | DIASTOLIC BLOOD PRESSURE: 56 MMHG | BODY MASS INDEX: 25.72 KG/M2 | SYSTOLIC BLOOD PRESSURE: 146 MMHG | WEIGHT: 131 LBS

## 2022-11-14 DIAGNOSIS — R07.9 CHEST PAIN, UNSPECIFIED: ICD-10-CM

## 2022-11-14 DIAGNOSIS — R94.31 ABNORMAL ECG: ICD-10-CM

## 2022-11-14 LAB
CREAT BLD-MCNC: 0.7 MG/DL
GFR SERPLBLD BASED ON 1.73 SQ M-ARVRAT: 87 ML/MIN/1.73M2 (ref 60–?)

## 2022-11-14 PROCEDURE — 82565 ASSAY OF CREATININE: CPT

## 2022-11-14 PROCEDURE — 75574 CT ANGIO HRT W/3D IMAGE: CPT | Performed by: INTERNAL MEDICINE

## 2022-11-14 RX ORDER — NITROGLYCERIN 0.4 MG/1
0.4 TABLET SUBLINGUAL ONCE
Status: COMPLETED | OUTPATIENT
Start: 2022-11-14 | End: 2022-11-14

## 2022-11-14 RX ORDER — METOPROLOL TARTRATE 5 MG/5ML
5 INJECTION INTRAVENOUS SEE ADMIN INSTRUCTIONS
Status: DISCONTINUED | OUTPATIENT
Start: 2022-11-14 | End: 2022-11-16

## 2022-11-14 RX ORDER — METOPROLOL TARTRATE 5 MG/5ML
INJECTION INTRAVENOUS
Status: DISCONTINUED
Start: 2022-11-14 | End: 2022-11-14

## 2022-11-14 RX ORDER — ALPRAZOLAM 0.25 MG/1
0.25 TABLET ORAL ONCE AS NEEDED
Status: COMPLETED | OUTPATIENT
Start: 2022-11-14 | End: 2022-11-14

## 2022-11-14 RX ORDER — ALPRAZOLAM 0.25 MG/1
TABLET ORAL
Status: COMPLETED
Start: 2022-11-14 | End: 2022-11-14

## 2022-11-14 RX ORDER — DILTIAZEM HYDROCHLORIDE 5 MG/ML
5 INJECTION INTRAVENOUS SEE ADMIN INSTRUCTIONS
Status: DISCONTINUED | OUTPATIENT
Start: 2022-11-14 | End: 2022-11-16

## 2022-11-14 RX ORDER — METOPROLOL TARTRATE 50 MG/1
1 TABLET, FILM COATED ORAL AS DIRECTED
COMMUNITY
Start: 2022-11-10

## 2022-11-14 RX ADMIN — Medication 50 MG: at 10:46:00

## 2022-11-14 RX ADMIN — ALPRAZOLAM 0.25 MG: 0.25 TABLET ORAL at 10:03:00

## 2022-11-14 RX ADMIN — Medication 50 MG: at 10:03:00

## 2022-11-14 RX ADMIN — NITROGLYCERIN 0.4 MG: 0.4 TABLET SUBLINGUAL at 11:45:00

## 2022-11-14 NOTE — IMAGING NOTE
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      PT CONSENTED AT [address]  /56 BMI 25.65/WT 131lb    CTA ORDERED BY Alex Villalba MD WAS PT GIVEN CTA  PREMEDS YES Metoprolol 50mg x2, last dose approx 0800    METOPROLOL PO GIVEN 50 MILLIGRAMS  MILLIGRAMS  AT 1003    1003 Alprazolam 0.25mg po given    18 GAUGE IV STARTED AT 1014 POC TESTING COMPLETED GFR = 78  CREATINE = 0.70    HR 63-57 (57 when resting, 63 when awake) /62; METOPROLOL 50 MG GIVEN AT 1043     METOPROLOL 5 MILLIGRAMS GIVEN IV PUSH  SEE  PROTOCOL    TO CT TABLE @ 1145    CONNECT TO MONITOR  VS HR 56, /59      NITROGLYCERIN 0.4 MILLIGRAMS SUBLINGUAL GIVEN AT 1145     CALCIUM SCORE COMPLETED AT 1148     INJECTION STARTED AT 1150 HR 54 DURING SCAN, PROCEDURE COMPLETE     POST SCAN VS HR 61, BP 11/55 AT 1155    PT TO HOLDING AREA  VS HR 62, /58 at 1204    1216 HR 52, /54    AVS  PROVIDED      1225 DISCHARGED HOME

## 2022-11-29 ENCOUNTER — LAB ENCOUNTER (OUTPATIENT)
Dept: LAB | Age: 82
End: 2022-11-29
Attending: FAMILY MEDICINE
Payer: MEDICARE

## 2022-11-29 DIAGNOSIS — R80.9 PROTEINURIA, UNSPECIFIED TYPE: ICD-10-CM

## 2022-11-29 LAB
CREAT UR-SCNC: 134 MG/DL
PROT UR-MCNC: 13.1 MG/DL

## 2022-11-29 PROCEDURE — 84156 ASSAY OF PROTEIN URINE: CPT

## 2022-11-29 PROCEDURE — 82570 ASSAY OF URINE CREATININE: CPT

## 2022-12-15 ENCOUNTER — TELEPHONE (OUTPATIENT)
Dept: FAMILY MEDICINE CLINIC | Facility: CLINIC | Age: 82
End: 2022-12-15

## 2022-12-15 NOTE — TELEPHONE ENCOUNTER
Please contact patient to schedule appointment with me within the next 2 weeks, okay to use res24. Let her know Dr. Erna Dacosta called me to let me know coronary angiogram was excellent. But requested I see her to evaluate slightly enlarged lymph nodes on CT scan. Please reassure her that this is unlikely to be anything worrisome.

## 2022-12-16 NOTE — TELEPHONE ENCOUNTER
Verified name and . Patient was advised of Dr. Courtney Mistry message.      Patient with upcoming appointment:  Future Appointments   Date Time Provider Zhang Aguilar   2022 11:30 AM Marilee Morales MD [address] [place] [address]

## 2022-12-19 ENCOUNTER — NURSE TRIAGE (OUTPATIENT)
Dept: FAMILY MEDICINE CLINIC | Facility: CLINIC | Age: 82
End: 2022-12-19

## 2022-12-19 ENCOUNTER — LAB ENCOUNTER (OUTPATIENT)
Dept: LAB | Age: 82
End: 2022-12-19
Attending: FAMILY MEDICINE
Payer: MEDICARE

## 2022-12-19 DIAGNOSIS — R35.0 FREQUENCY OF URINATION: Primary | ICD-10-CM

## 2022-12-19 DIAGNOSIS — R30.9 PAIN WITH URINATION: ICD-10-CM

## 2022-12-19 LAB
BILIRUB UR QL: NEGATIVE
COLOR UR: YELLOW
GLUCOSE UR-MCNC: NEGATIVE MG/DL
KETONES UR-MCNC: NEGATIVE MG/DL
NITRITE UR QL STRIP.AUTO: NEGATIVE
PH UR: 8 [PH] (ref 5–8)
SP GR UR STRIP: 1.02 (ref 1–1.03)
UROBILINOGEN UR STRIP-ACNC: 0.2

## 2022-12-19 PROCEDURE — 87077 CULTURE AEROBIC IDENTIFY: CPT | Performed by: FAMILY MEDICINE

## 2022-12-19 PROCEDURE — 87186 SC STD MICRODIL/AGAR DIL: CPT | Performed by: FAMILY MEDICINE

## 2022-12-19 PROCEDURE — 81015 MICROSCOPIC EXAM OF URINE: CPT | Performed by: FAMILY MEDICINE

## 2022-12-19 PROCEDURE — 87086 URINE CULTURE/COLONY COUNT: CPT | Performed by: FAMILY MEDICINE

## 2022-12-19 PROCEDURE — 81001 URINALYSIS AUTO W/SCOPE: CPT | Performed by: FAMILY MEDICINE

## 2022-12-19 RX ORDER — NITROFURANTOIN 25; 75 MG/1; MG/1
100 CAPSULE ORAL 2 TIMES DAILY
Qty: 10 CAPSULE | Refills: 0 | Status: SHIPPED | OUTPATIENT
Start: 2022-12-19 | End: 2022-12-24

## 2022-12-19 NOTE — TELEPHONE ENCOUNTER
Please let patient know Rx sent to pharmacy. But if possible provide urine test before taking first dose. If this is not possible then I do not recommend doing urine test at all. Push fluids, call if worsening.

## 2022-12-19 NOTE — TELEPHONE ENCOUNTER
Dr. Elba Weber: patient called back and states she can come to have urine test if you would like her to complete the test first. Pended for review.

## 2022-12-21 ENCOUNTER — NURSE TRIAGE (OUTPATIENT)
Dept: FAMILY MEDICINE CLINIC | Facility: CLINIC | Age: 82
End: 2022-12-21

## 2022-12-21 RX ORDER — NITROFURANTOIN 25; 75 MG/1; MG/1
100 CAPSULE ORAL 2 TIMES DAILY
Qty: 10 CAPSULE | Refills: 0 | Status: SHIPPED | OUTPATIENT
Start: 2022-12-21 | End: 2022-12-26

## 2022-12-21 NOTE — TELEPHONE ENCOUNTER
Spoke to patient and relayed Dr. Mosley Field message below. Patient verbalized understanding and had no further questions.

## 2022-12-21 NOTE — TELEPHONE ENCOUNTER
Can try supportive care measures at home. Can try 1 g of Tylenol every 6 hours. To use ice/heat.   Follow-up in urgent care office if no improvement

## 2023-01-03 ENCOUNTER — LAB ENCOUNTER (OUTPATIENT)
Dept: LAB | Age: 83
End: 2023-01-03
Attending: FAMILY MEDICINE
Payer: MEDICARE

## 2023-01-03 ENCOUNTER — OFFICE VISIT (OUTPATIENT)
Dept: FAMILY MEDICINE CLINIC | Facility: CLINIC | Age: 83
End: 2023-01-03
Payer: MEDICARE

## 2023-01-03 VITALS
TEMPERATURE: 98 F | HEIGHT: 60 IN | BODY MASS INDEX: 26.31 KG/M2 | OXYGEN SATURATION: 99 % | WEIGHT: 134 LBS | DIASTOLIC BLOOD PRESSURE: 74 MMHG | HEART RATE: 94 BPM | SYSTOLIC BLOOD PRESSURE: 134 MMHG

## 2023-01-03 DIAGNOSIS — M54.16 LUMBAR RADICULOPATHY: ICD-10-CM

## 2023-01-03 DIAGNOSIS — K21.9 GERD WITHOUT ESOPHAGITIS: ICD-10-CM

## 2023-01-03 DIAGNOSIS — E78.1 HIGH TRIGLYCERIDES: ICD-10-CM

## 2023-01-03 DIAGNOSIS — I10 ESSENTIAL HYPERTENSION: ICD-10-CM

## 2023-01-03 DIAGNOSIS — N39.0 URINARY TRACT INFECTION WITHOUT HEMATURIA, SITE UNSPECIFIED: ICD-10-CM

## 2023-01-03 DIAGNOSIS — R59.0 MEDIASTINAL ADENOPATHY: Primary | ICD-10-CM

## 2023-01-03 PROCEDURE — 99214 OFFICE O/P EST MOD 30 MIN: CPT | Performed by: FAMILY MEDICINE

## 2023-01-03 RX ORDER — FAMOTIDINE 20 MG/1
20 TABLET, FILM COATED ORAL 2 TIMES DAILY
Qty: 180 TABLET | Refills: 3 | Status: SHIPPED | OUTPATIENT
Start: 2023-01-03

## 2023-01-03 RX ORDER — MELATONIN: COMMUNITY

## 2023-01-05 ENCOUNTER — LAB ENCOUNTER (OUTPATIENT)
Dept: LAB | Age: 83
End: 2023-01-05
Attending: FAMILY MEDICINE
Payer: MEDICARE

## 2023-01-05 DIAGNOSIS — E78.1 HIGH TRIGLYCERIDES: ICD-10-CM

## 2023-01-05 LAB
CHOLEST SERPL-MCNC: 145 MG/DL (ref ?–200)
FASTING PATIENT LIPID ANSWER: YES
HDLC SERPL-MCNC: 43 MG/DL (ref 40–59)
LDLC SERPL CALC-MCNC: 67 MG/DL (ref ?–100)
NONHDLC SERPL-MCNC: 102 MG/DL (ref ?–130)
TRIGL SERPL-MCNC: 216 MG/DL (ref 30–149)
VLDLC SERPL CALC-MCNC: 33 MG/DL (ref 0–30)

## 2023-01-05 PROCEDURE — 36415 COLL VENOUS BLD VENIPUNCTURE: CPT

## 2023-01-05 PROCEDURE — 80061 LIPID PANEL: CPT

## 2023-01-08 ENCOUNTER — PATIENT MESSAGE (OUTPATIENT)
Dept: FAMILY MEDICINE CLINIC | Facility: CLINIC | Age: 83
End: 2023-01-08

## 2023-03-07 ENCOUNTER — HOSPITAL ENCOUNTER (OUTPATIENT)
Dept: CT IMAGING | Facility: HOSPITAL | Age: 83
Discharge: HOME | End: 2023-03-07
Attending: FAMILY MEDICINE
Payer: MEDICARE

## 2023-03-07 DIAGNOSIS — R59.0 MEDIASTINAL ADENOPATHY: ICD-10-CM

## 2023-03-07 LAB
CREAT BLD-MCNC: 0.7 MG/DL
GFR SERPLBLD BASED ON 1.73 SQ M-ARVRAT: 86 ML/MIN/1.73M2 (ref 60–?)

## 2023-03-07 PROCEDURE — 71260 CT THORAX DX C+: CPT | Performed by: FAMILY MEDICINE

## 2023-03-07 PROCEDURE — 82565 ASSAY OF CREATININE: CPT

## 2023-03-08 ENCOUNTER — PATIENT MESSAGE (OUTPATIENT)
Dept: ADMINISTRATIVE | Age: 83
End: 2023-03-08

## 2023-03-08 DIAGNOSIS — J84.10 FIBROSIS LUNG (HCC): Primary | ICD-10-CM

## 2023-03-09 ENCOUNTER — PATIENT MESSAGE (OUTPATIENT)
Dept: FAMILY MEDICINE CLINIC | Facility: CLINIC | Age: 83
End: 2023-03-09

## 2023-03-09 NOTE — TELEPHONE ENCOUNTER
I sent a staff message to Dr. Janie Ireland. Nurses, could you also reach out to pulmonary staff. Let them know goal to see patient within the next 3 weeks. Should keep appointment with me as currently scheduled.

## 2023-03-10 ENCOUNTER — TELEPHONE (OUTPATIENT)
Dept: PULMONOLOGY | Facility: CLINIC | Age: 83
End: 2023-03-10

## 2023-03-10 NOTE — TELEPHONE ENCOUNTER
PULMONOLOGY STAFF=see DR He's note below, please contact patient, thanks.          Future Appointments   Date Time Provider Zhang Ramirezi   5/8/2023  9:30 AM Amish Franklin MD [address] [place] [address]   5/26/2023  2:00 PM Josias Slaughter MD [facility] [place]

## 2023-03-10 NOTE — TELEPHONE ENCOUNTER
Please see other TE as I have routed to Dr. Francisca Carmona for review. Universal Safety Interventions Fall with Harm Risk

## 2023-03-10 NOTE — TELEPHONE ENCOUNTER
Dr. Esau Willis, Dr. Governor Ko is requesting for patient to be seen sooner. You do have an appointment available 3/27/23 at 12:15. Is it ok to schedule her then. 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 Evangelista Boucher, MDPhysicianSigned  Yesterday                    I sent a staff message to Dr. Esau Willis. Nurses, could you also reach out to pulmonary staff. Let them know goal to see patient within the next 3 weeks. Should keep appointment with me as currently scheduled.

## 2023-03-20 NOTE — TELEPHONE ENCOUNTER
----- Message from Radha May MD sent at 3/8/2023  4:20 PM CST -----  I am not sure why the chest CT came to my in basket seems like the patient never seen by me before . Seems like the CT showed some ILD changes  Please refer to the primary MD Dr. Alex Peña .   Unless the patient scheduled to have an appointment with me in the near future , which is okay with me

## 2023-03-20 NOTE — TELEPHONE ENCOUNTER
Dr. Sarai Oates scheduled for 15 CONSULT Monday 3/27/23. Willy Lopez referral from Dr. Demetria Veliz. OK with appointment date or re-schedule?

## 2023-04-19 ENCOUNTER — OFFICE VISIT (OUTPATIENT)
Dept: PULMONOLOGY | Facility: CLINIC | Age: 83
End: 2023-04-19

## 2023-04-19 VITALS
WEIGHT: 142 LBS | SYSTOLIC BLOOD PRESSURE: 120 MMHG | OXYGEN SATURATION: 98 % | HEART RATE: 98 BPM | BODY MASS INDEX: 28 KG/M2 | RESPIRATION RATE: 14 BRPM | DIASTOLIC BLOOD PRESSURE: 67 MMHG

## 2023-04-19 DIAGNOSIS — J84.9 ILD (INTERSTITIAL LUNG DISEASE) (HCC): Primary | ICD-10-CM

## 2023-04-19 DIAGNOSIS — J44.9 CHRONIC OBSTRUCTIVE PULMONARY DISEASE, UNSPECIFIED COPD TYPE (HCC): ICD-10-CM

## 2023-04-19 PROCEDURE — 99204 OFFICE O/P NEW MOD 45 MIN: CPT | Performed by: INTERNAL MEDICINE

## 2023-05-08 ENCOUNTER — OFFICE VISIT (OUTPATIENT)
Dept: FAMILY MEDICINE CLINIC | Facility: CLINIC | Age: 83
End: 2023-05-08

## 2023-05-08 VITALS
BODY MASS INDEX: 27 KG/M2 | SYSTOLIC BLOOD PRESSURE: 125 MMHG | OXYGEN SATURATION: 99 % | TEMPERATURE: 98 F | WEIGHT: 138.38 LBS | HEART RATE: 76 BPM | DIASTOLIC BLOOD PRESSURE: 75 MMHG

## 2023-05-08 DIAGNOSIS — Z87.440 HISTORY OF RECURRENT UTI (URINARY TRACT INFECTION): ICD-10-CM

## 2023-05-08 DIAGNOSIS — K21.9 GERD WITHOUT ESOPHAGITIS: ICD-10-CM

## 2023-05-08 DIAGNOSIS — I10 ESSENTIAL HYPERTENSION: Primary | ICD-10-CM

## 2023-05-08 DIAGNOSIS — M85.80 OSTEOPENIA, UNSPECIFIED LOCATION: ICD-10-CM

## 2023-05-08 DIAGNOSIS — M54.16 LUMBAR RADICULOPATHY: ICD-10-CM

## 2023-05-08 PROCEDURE — 99214 OFFICE O/P EST MOD 30 MIN: CPT | Performed by: FAMILY MEDICINE

## 2023-05-08 RX ORDER — NICOTINE POLACRILEX 4 MG/1
20 GUM, CHEWING ORAL DAILY
COMMUNITY
Start: 2023-05-08

## 2023-05-08 RX ORDER — ACETAMINOPHEN AND CODEINE PHOSPHATE 300; 30 MG/1; MG/1
1 TABLET ORAL DAILY PRN
COMMUNITY
Start: 2023-02-12

## 2023-05-19 ENCOUNTER — HOSPITAL ENCOUNTER (OUTPATIENT)
Dept: RESPIRATORY THERAPY | Facility: HOSPITAL | Age: 83
Discharge: HOME | End: 2023-05-19
Attending: INTERNAL MEDICINE
Payer: MEDICARE

## 2023-05-19 DIAGNOSIS — J84.9 ILD (INTERSTITIAL LUNG DISEASE) (HCC): ICD-10-CM

## 2023-05-19 DIAGNOSIS — J44.9 CHRONIC OBSTRUCTIVE PULMONARY DISEASE, UNSPECIFIED COPD TYPE (HCC): ICD-10-CM

## 2023-05-19 PROCEDURE — 94729 DIFFUSING CAPACITY: CPT | Performed by: INTERNAL MEDICINE

## 2023-05-19 PROCEDURE — 94060 EVALUATION OF WHEEZING: CPT | Performed by: INTERNAL MEDICINE

## 2023-05-19 PROCEDURE — 94726 PLETHYSMOGRAPHY LUNG VOLUMES: CPT | Performed by: INTERNAL MEDICINE

## 2023-05-19 NOTE — PROCEDURES
[facility]     Pulmonary Function Test     Yojana Bustamante Patient Status:  Outpatient    1940 MRN [id number]   Date of Exam 23 PCP Roz Caba MD           Spirometry   FEV1: 1.76 108%  FVC: 2.26 106%  FEV1/FVC: 0.78    Lung Volume   TLC: 3.40 77%  RV : 1.09 49%    Diffusion Capacity   DLCO: 11.7 67%    Flow Volume Loop       Impression   No evidence of obstructive sleep defect seen without significant postbronchodilator response observed.   Slightly decreased lung volumes and diffusion capacity    Pamela Cannon DO  Pulmonary [address] [place] [address]

## 2023-07-05 ENCOUNTER — OFFICE VISIT (OUTPATIENT)
Dept: PULMONOLOGY | Facility: CLINIC | Age: 83
End: 2023-07-05

## 2023-07-05 VITALS
DIASTOLIC BLOOD PRESSURE: 78 MMHG | OXYGEN SATURATION: 98 % | HEART RATE: 87 BPM | BODY MASS INDEX: 27.29 KG/M2 | SYSTOLIC BLOOD PRESSURE: 135 MMHG | WEIGHT: 139 LBS | HEIGHT: 60 IN | RESPIRATION RATE: 20 BRPM

## 2023-07-05 DIAGNOSIS — J84.9 ILD (INTERSTITIAL LUNG DISEASE) (HCC): Primary | ICD-10-CM

## 2023-07-05 PROCEDURE — 99214 OFFICE O/P EST MOD 30 MIN: CPT | Performed by: INTERNAL MEDICINE

## 2023-09-12 ENCOUNTER — PATIENT MESSAGE (OUTPATIENT)
Dept: FAMILY MEDICINE CLINIC | Facility: CLINIC | Age: 83
End: 2023-09-12

## 2023-11-03 RX ORDER — LOSARTAN POTASSIUM 100 MG/1
100 TABLET ORAL DAILY
Qty: 90 TABLET | Refills: 3 | Status: SHIPPED | OUTPATIENT
Start: 2023-11-03

## 2023-11-03 NOTE — TELEPHONE ENCOUNTER
Please review. Protocol failed / Has no protocol. Future Appointments   Date Time Provider Zhang Aguilar   11/7/2023  9:45 AM Calla Rubinstein, MD [address] [place] [address]   3/6/2024 12:45 PM Tyrone Horton MD [facility]     No Active/ Future labs pended yet    Requested Prescriptions   Pending Prescriptions Disp Refills    LOSARTAN 100 MG Oral Tab [Pharmacy Med Name: LOSARTAN 100MG TABLETS] 90 tablet 3     Sig: TAKE 1 TABLET(100 MG) BY MOUTH DAILY       Hypertensive Medications Protocol Failed - 11/2/2023  7:42 AM        Failed - CMP or BMP in past 6 months     No results found for this or any previous visit (from the past 4392 hour(s)).             Passed - In person appointment in the past 12 or next 3 months     Recent Outpatient Visits              4 months ago ILD (interstitial lung disease) [facility])    [address], [address] [place] [address] Tyrone Horton MD    Office Visit    5 months ago Essential hypertension    [address], [place] [address], [facility] Calla Rubinstein, MD    Office Visit    6 months ago ILD (interstitial lung disease) [facility])    [address], [address] [facility] Tyrone Horton MD    Office Visit    10 months ago Mediastinal adenopathy    [facility], [place] [address], [facility] Calla Rubinstein, MD    Office Visit    11 months ago Encounter for annual health examination    [address] [facility] Calla Rubinstein, MD    Office Visit          Future Appointments         Provider Department Appt Notes    In 4 days Calla Rubinstein, MD [address], [place] [address], [facility] Annual visit  Last px 25/71/56, Policy advised    In 4 months [facility] [address] [place] [address] 8 month follow up                      Passed - Last BP reading less than 140/90     BP Readings from Last 1 Encounters:  07/05/23 : 135/78              Passed - In person appointment or virtual visit in the past 6 months     Recent Outpatient Visits              4 months ago ILD (interstitial lung disease) [facility])    [facility], [address] [place] [address] Sara Jones MD    Office Visit    5 months ago [facility]ential hypertension    [facility] [address], [address] [facility] MD Debbie    Office Visit    6 months ago ILD (interstitial lung disease) [facility])    [address], [address] [facility] Sara Jones MD    Office Visit    10 months ago [facility]iastinal aden[facility]    [facility] [address], [address] Rehan Carson MD    Office Visit    11 months ago Encounter for annual health examination    [address] [facility], [address] Rehan Carson MD    Office Visit          Future Appointments         Provider Department Appt Notes    In 4 days Rehan Carson MD [address], [address], [address] Annual visit  Last px 19/57/16, Policy advised    In 4 months Sara Jones MD [address], [address] [place] [address] 8 month follow up                      [facility] or GFRNAA > 50     GFR Evaluation  EGFRCR: 86 , resulted on 3/7/2023             Future Appointments         Provider Department Appt Notes    In 4 days Rehan Carson MD [address], [address], [address] Annual visit  Last px 25/56/23, Policy advised    In 4 months [facility], [address] [place] [address] 8 month follow up           Recent Outpatient Visits              4 months ago ILD (interstitial lung disease) [facility])    Anne-Marie Gracia MD    Office Visit    5 months ago Essential hypertension    [address], [address], [address] [facility] MD Debbie    Office Visit    6 months ago ILD (interstitial lung disease) [facility])    [facility] [place] Yajaira Roger MD    Office Visit    10 months ago Mediastinal adenopathy    Savage Barnes MD    Office Visit    11 months ago Encounter for annual health examination    [address], [address], Di Matos MD    Office Visit

## 2023-11-07 ENCOUNTER — LAB ENCOUNTER (OUTPATIENT)
Dept: LAB | Age: 83
End: 2023-11-07
Attending: FAMILY MEDICINE
Payer: MEDICARE

## 2023-11-07 ENCOUNTER — OFFICE VISIT (OUTPATIENT)
Dept: FAMILY MEDICINE CLINIC | Facility: CLINIC | Age: 83
End: 2023-11-07
Payer: MEDICARE

## 2023-11-07 VITALS
HEIGHT: 60 IN | HEART RATE: 70 BPM | SYSTOLIC BLOOD PRESSURE: 120 MMHG | RESPIRATION RATE: 17 BRPM | WEIGHT: 135 LBS | OXYGEN SATURATION: 98 % | DIASTOLIC BLOOD PRESSURE: 88 MMHG | BODY MASS INDEX: 26.5 KG/M2

## 2023-11-07 DIAGNOSIS — J84.9 ILD (INTERSTITIAL LUNG DISEASE) (HCC): ICD-10-CM

## 2023-11-07 DIAGNOSIS — E78.5 HYPERLIPIDEMIA, UNSPECIFIED HYPERLIPIDEMIA TYPE: ICD-10-CM

## 2023-11-07 DIAGNOSIS — F31.70 BIPOLAR AFFECTIVE DISORDER IN REMISSION (HCC): ICD-10-CM

## 2023-11-07 DIAGNOSIS — I10 ESSENTIAL HYPERTENSION: ICD-10-CM

## 2023-11-07 DIAGNOSIS — Z00.00 ENCOUNTER FOR ANNUAL HEALTH EXAMINATION: Primary | ICD-10-CM

## 2023-11-07 DIAGNOSIS — M81.0 AGE-RELATED OSTEOPOROSIS WITHOUT CURRENT PATHOLOGICAL FRACTURE: ICD-10-CM

## 2023-11-07 DIAGNOSIS — M54.16 LUMBAR RADICULOPATHY: ICD-10-CM

## 2023-11-07 DIAGNOSIS — R35.0 FREQUENT URINATION: ICD-10-CM

## 2023-11-07 DIAGNOSIS — K21.9 GERD WITHOUT ESOPHAGITIS: ICD-10-CM

## 2023-11-07 LAB
ALBUMIN SERPL-MCNC: 4.6 G/DL (ref 3.2–4.8)
ALBUMIN/GLOB SERPL: 1.8 {RATIO} (ref 1–2)
ALP LIVER SERPL-CCNC: 54 U/L
ALT SERPL-CCNC: 20 U/L
ANION GAP SERPL CALC-SCNC: 14 MMOL/L (ref 0–18)
AST SERPL-CCNC: 22 U/L (ref ?–34)
BASOPHILS # BLD AUTO: 0.04 X10(3) UL (ref 0–0.2)
BASOPHILS NFR BLD AUTO: 0.4 %
BILIRUB SERPL-MCNC: 0.6 MG/DL (ref 0.2–1.1)
BUN BLD-MCNC: 19 MG/DL (ref 9–23)
BUN/CREAT SERPL: 22.1 (ref 10–20)
CALCIUM BLD-MCNC: 10.4 MG/DL (ref 8.7–10.4)
CHLORIDE SERPL-SCNC: 102 MMOL/L (ref 98–112)
CHOLEST SERPL-MCNC: 156 MG/DL (ref ?–200)
CO2 SERPL-SCNC: 23 MMOL/L (ref 21–32)
CREAT BLD-MCNC: 0.86 MG/DL
DEPRECATED RDW RBC AUTO: 43.2 FL (ref 35.1–46.3)
EGFRCR SERPLBLD CKD-EPI 2021: 67 ML/MIN/1.73M2 (ref 60–?)
EOSINOPHIL # BLD AUTO: 0.23 X10(3) UL (ref 0–0.7)
EOSINOPHIL NFR BLD AUTO: 2.5 %
ERYTHROCYTE [DISTWIDTH] IN BLOOD BY AUTOMATED COUNT: 12.5 % (ref 11–15)
FASTING PATIENT LIPID ANSWER: YES
FASTING STATUS PATIENT QL REPORTED: YES
GLOBULIN PLAS-MCNC: 2.6 G/DL (ref 2.8–4.4)
GLUCOSE BLD-MCNC: 102 MG/DL (ref 70–99)
HCT VFR BLD AUTO: 37.5 %
HDLC SERPL-MCNC: 45 MG/DL (ref 40–59)
HGB BLD-MCNC: 12.7 G/DL
IMM GRANULOCYTES # BLD AUTO: 0.04 X10(3) UL (ref 0–1)
IMM GRANULOCYTES NFR BLD: 0.4 %
LDLC SERPL CALC-MCNC: 79 MG/DL (ref ?–100)
LYMPHOCYTES # BLD AUTO: 3.62 X10(3) UL (ref 1–4)
LYMPHOCYTES NFR BLD AUTO: 38.6 %
MCH RBC QN AUTO: 32.1 PG (ref 26–34)
MCHC RBC AUTO-ENTMCNC: 33.9 G/DL (ref 31–37)
MCV RBC AUTO: 94.7 FL
MONOCYTES # BLD AUTO: 0.86 X10(3) UL (ref 0.1–1)
MONOCYTES NFR BLD AUTO: 9.2 %
NEUTROPHILS # BLD AUTO: 4.59 X10 (3) UL (ref 1.5–7.7)
NEUTROPHILS # BLD AUTO: 4.59 X10(3) UL (ref 1.5–7.7)
NEUTROPHILS NFR BLD AUTO: 48.9 %
NONHDLC SERPL-MCNC: 111 MG/DL (ref ?–130)
OSMOLALITY SERPL CALC.SUM OF ELEC: 290 MOSM/KG (ref 275–295)
PLATELET # BLD AUTO: 328 10(3)UL (ref 150–450)
POTASSIUM SERPL-SCNC: 4 MMOL/L (ref 3.5–5.1)
PROT SERPL-MCNC: 7.2 G/DL (ref 5.7–8.2)
RBC # BLD AUTO: 3.96 X10(6)UL
SODIUM SERPL-SCNC: 139 MMOL/L (ref 136–145)
TRIGL SERPL-MCNC: 187 MG/DL (ref 30–149)
TSI SER-ACNC: 1.19 MIU/ML (ref 0.55–4.78)
VLDLC SERPL CALC-MCNC: 29 MG/DL (ref 0–30)
WBC # BLD AUTO: 9.4 X10(3) UL (ref 4–11)

## 2023-11-07 PROCEDURE — 80053 COMPREHEN METABOLIC PANEL: CPT

## 2023-11-07 PROCEDURE — 36415 COLL VENOUS BLD VENIPUNCTURE: CPT

## 2023-11-07 PROCEDURE — 84443 ASSAY THYROID STIM HORMONE: CPT

## 2023-11-07 PROCEDURE — 85025 COMPLETE CBC W/AUTO DIFF WBC: CPT

## 2023-11-07 PROCEDURE — 80061 LIPID PANEL: CPT

## 2023-11-07 PROCEDURE — G0439 PPPS, SUBSEQ VISIT: HCPCS | Performed by: FAMILY MEDICINE

## 2023-11-07 RX ORDER — ACETAMINOPHEN AND CODEINE PHOSPHATE 300; 30 MG/1; MG/1
1 TABLET ORAL DAILY PRN
Qty: 20 TABLET | Refills: 0 | Status: SHIPPED | OUTPATIENT
Start: 2023-11-07

## 2023-11-07 RX ORDER — NICOTINE POLACRILEX 4 MG/1
20 GUM, CHEWING ORAL 2 TIMES DAILY
Qty: 180 TABLET | Refills: 1 | Status: SHIPPED | OUTPATIENT
Start: 2023-11-07

## 2023-12-11 ENCOUNTER — TELEPHONE (OUTPATIENT)
Dept: FAMILY MEDICINE CLINIC | Facility: CLINIC | Age: 83
End: 2023-12-11

## 2024-01-04 RX ORDER — ATORVASTATIN CALCIUM 10 MG/1
10 TABLET, FILM COATED ORAL NIGHTLY
Qty: 90 TABLET | Refills: 3 | Status: SHIPPED | OUTPATIENT
Start: 2024-01-04

## 2024-01-04 NOTE — TELEPHONE ENCOUNTER
Refill passed per [facility] protocol.  Requested Prescriptions   Pending Prescriptions Disp Refills    atorvastatin 10 MG Oral Tab 90 tablet 3     Sig: Take 1 tablet (10 mg total) by mouth nightly.       Cholesterol Medication Protocol Passed - 1/3/2024  3:15 PM        Passed - ALT in past 12 months        Passed - LDL in past 12 months        Passed - Last ALT < 80     Lab Results   Component Value Date    ALT 20 11/07/2023             Passed - Last LDL < 130     Lab Results   Component Value Date    LDL 79 11/07/2023             Passed - In person appointment or virtual visit in the past 12 mos or appointment in next 3 mos     Recent Outpatient Visits              1 month ago [facility]    6 months ago ILD (interstitial lung disease) ([facility])    [facility] Kim Zaman MD    Office Visit    8 months ago Essential hypertension    [facility]    [facility]    8 months ago ILD (interstitial lung disease) ([facility])    [facility] Kim Zaman MD    Office Visit    1 year ago [facility]iastinal adenopathy    [facility]    [facility]          Future Appointments         Provider Department Appt Notes    In 2 months Kim Zaman MD [facility] 8 month follow up    In 3 months Abdoul Tan MD [facility]                  Future Appointments         Provider Department Appt Notes    In 2 months Kim Zaman MD [facility] 8 month follow up    In 3 months Abdoul Tan MD [facility] gerd           Recent Outpatient Visits              1 month ago Encounter for annual health examination    [facility]    Office Visit    6 months ago ILD (interstitial lung disease) ([facility])    [facility], [facility], Kim Pope MD    Office Visit    8 months ago Essential hypertension    [facility]    Office Visit    8 months ago ILD (interstitial lung disease) ([facility])    [facility], [facility], Kim Pope MD    Office Visit    1 year ago Mediastinal adenopathy    [facility]    Office Visit

## 2024-02-06 ENCOUNTER — PATIENT MESSAGE (OUTPATIENT)
Dept: FAMILY MEDICINE CLINIC | Facility: CLINIC | Age: 84
End: 2024-02-06

## 2024-02-06 DIAGNOSIS — M54.16 LUMBAR RADICULOPATHY: Primary | ICD-10-CM

## 2024-02-06 RX ORDER — ACETAMINOPHEN AND CODEINE PHOSPHATE 300; 30 MG/1; MG/1
1 TABLET ORAL DAILY PRN
Qty: 20 TABLET | Refills: 0 | Status: SHIPPED | OUTPATIENT
Start: 2024-02-06

## 2024-02-06 NOTE — TELEPHONE ENCOUNTER
From: Alma Rodriguez  To: Shikha He  Sent: 2/6/2024 2:08 PM CST  Subject: Prescription    Dr He prescribed acetaminophen/codeine 3 tablets for me November 7 2023. The instructions were Take one tablet by mouth daily as needed.   I would like another prescription for 20 again.   Alma Rodriguez  [phone]

## 2024-03-06 ENCOUNTER — OFFICE VISIT (OUTPATIENT)
Dept: PULMONOLOGY | Facility: CLINIC | Age: 84
End: 2024-03-06
Payer: MEDICARE

## 2024-03-06 VITALS
HEART RATE: 82 BPM | OXYGEN SATURATION: 99 % | WEIGHT: 135 LBS | BODY MASS INDEX: 26.5 KG/M2 | DIASTOLIC BLOOD PRESSURE: 75 MMHG | SYSTOLIC BLOOD PRESSURE: 149 MMHG | HEIGHT: 60 IN

## 2024-03-06 DIAGNOSIS — J84.9 ILD (INTERSTITIAL LUNG DISEASE) (HCC): Primary | ICD-10-CM

## 2024-03-06 PROCEDURE — 3008F BODY MASS INDEX DOCD: CPT | Performed by: INTERNAL MEDICINE

## 2024-03-06 PROCEDURE — 1160F RVW MEDS BY RX/DR IN RCRD: CPT | Performed by: INTERNAL MEDICINE

## 2024-03-06 PROCEDURE — 3078F DIAST BP <80 MM HG: CPT | Performed by: INTERNAL MEDICINE

## 2024-03-06 PROCEDURE — 99214 OFFICE O/P EST MOD 30 MIN: CPT | Performed by: INTERNAL MEDICINE

## 2024-03-06 PROCEDURE — 3077F SYST BP >= 140 MM HG: CPT | Performed by: INTERNAL MEDICINE

## 2024-03-06 PROCEDURE — 1159F MED LIST DOCD IN RCRD: CPT | Performed by: INTERNAL MEDICINE

## 2024-03-06 NOTE — PROGRESS NOTES
Subjective:   Patient ID: Alma Rodriguez is a 84 year old female.    HPI  Doing very well overall  No change in her pulmonary symptoms since last year  Mild dyspnea upon exertion or activity more limited related to back pain  Shoulder pain  Minimal occasional cough  No sputum  No change in weight  No fever  History/Other:   Review of Systems   Constitutional: Negative.    Cardiovascular: Negative.    Musculoskeletal:  Positive for arthralgias and back pain.   Neurological: Negative.    Hematological: Negative.    Psychiatric/Behavioral: Negative.       Current Outpatient Medications   Medication Sig Dispense Refill    acetaminophen-codeine 300-30 MG Oral Tab Take 1 tablet by mouth daily as needed. 20 tablet 0    atorvastatin 10 MG Oral Tab Take 1 tablet (10 mg total) by mouth nightly. 90 tablet 3    Omeprazole 20 MG Oral Tab EC Take 20 mg by mouth in the morning and 20 mg before bedtime. 180 tablet 1    losartan 100 MG Oral Tab Take 1 tablet (100 mg total) by mouth daily. 90 tablet 3    cholecalciferol 25 MCG (1000 UT) Oral Tab       acetaminophen 325 MG Oral Tab       Cranberry 400 MG Oral Cap  60 capsule 5    Fexofenadine HCl 180 MG Oral Tab Take 1 tablet (180 mg total) by mouth daily.      lamoTRIgine 150 MG Oral Tab Take 1 tablet (150 mg total) by mouth daily. 90 tablet 1    diazepam 2 MG Oral Tab Take 1 tablet (2 mg total) by mouth nightly as needed for Anxiety. 30 tablet 5     Allergies:  Allergies   Allergen Reactions    Nitrofurantoin OTHER (SEE COMMENTS)     Lung fibrosis    Penicillins UNKNOWN    Sulfa Antibiotics RASH       Objective:   Physical Exam  Constitutional:       General: She is not in acute distress.     Appearance: She is not ill-appearing.   HENT:      Head: Normocephalic and atraumatic.      Nose: Nose normal.   Eyes:      General: No scleral icterus.     Pupils: Pupils are equal, round, and reactive to light.   Cardiovascular:      Rate and Rhythm: Normal rate.      Heart sounds:       No gallop.   Pulmonary:      Effort: No respiratory distress.      Breath sounds: No stridor. No wheezing, rhonchi or rales.   Chest:      Chest wall: No tenderness.   Abdominal:      General: Abdomen is flat. Bowel sounds are normal.      Palpations: Abdomen is soft.      Tenderness: There is no guarding.   Musculoskeletal:      Cervical back: Normal range of motion.      Right lower leg: No edema.      Left lower leg: No edema.   Skin:     General: Skin is dry.   Neurological:      Mental Status: She is oriented to person, place, and time.         Assessment & Plan:   1. ILD (interstitial lung disease) (HCC)        1- mild ILD    Mild apical peripheral fibrotic changes , minimal mediastinal lymph nodes  No other suspicious finding  Not a UIP / IPF pattern   Likely chronic /CXR  2019 showed COPD and some fibrotic changes  Asymptomatic with normal lung exam normal O2 sat on room air  Slight reduction in DLCO 67% otherwise normal PFTs     ? Etiology : ? Related to prior h/o smoking / COPD                     ?  Pleural-parenchymal fibroelestosis PPFE                        Doubt HP     Former smoker quit around year 2000 / with 30 -pack-year history of smoking  No occupational exposure  No pets exposure  No other obvious triggers or exposures  No clinical evidence of autoimmune disease     Doing well   F/u chest ct in 1 year interval to assure stability  Chest CT in 2 to 3 months        Follow-up with me in 6- 8 months    Meds This Visit:  Requested Prescriptions      No prescriptions requested or ordered in this encounter       Imaging & Referrals:  None

## 2024-03-11 ENCOUNTER — NURSE TRIAGE (OUTPATIENT)
Dept: FAMILY MEDICINE CLINIC | Facility: CLINIC | Age: 84
End: 2024-03-11

## 2024-03-11 NOTE — TELEPHONE ENCOUNTER
Action Requested: Summary for Provider     []  Critical Lab, Recommendations Needed  [] Need Additional Advice  []   FYI    []   Need Orders  [] Need Medications Sent to Pharmacy  []  Other     SUMMARY: Per protocol disposition advised office visit within 3 days     Future Appointments   Date Time Provider Department Center   3/12/2024 10:45 AM Shikha He MD [facility]   4/24/2024  9:30 AM Abdoul Tan MD [facility]   6/13/2024  3:00 PM [facility] CT RM1 CARD [facility]     Reviewed emergency symptoms and when patient should be seen sooner in ER/ICC.     Reason for call: Blood Pressure  Onset: unknown    Patient states blood pressure has been running higher 140-166's/ 80-90's. No headache, blurred vision, chest pain or shortness of breath. Denies other symptoms marked no under protocol.     Reason for Disposition   Systolic BP >= 160 OR Diastolic >= 100    Protocols used: Blood Pressure - High-A-OH

## 2024-03-12 ENCOUNTER — OFFICE VISIT (OUTPATIENT)
Dept: FAMILY MEDICINE CLINIC | Facility: CLINIC | Age: 84
End: 2024-03-12
Payer: MEDICARE

## 2024-03-12 VITALS
DIASTOLIC BLOOD PRESSURE: 78 MMHG | SYSTOLIC BLOOD PRESSURE: 143 MMHG | WEIGHT: 133.38 LBS | RESPIRATION RATE: 18 BRPM | HEIGHT: 59.75 IN | BODY MASS INDEX: 26.19 KG/M2

## 2024-03-12 DIAGNOSIS — F41.9 ANXIETY: ICD-10-CM

## 2024-03-12 DIAGNOSIS — I10 ESSENTIAL HYPERTENSION: Primary | ICD-10-CM

## 2024-03-12 DIAGNOSIS — K52.9 FREQUENT STOOLS: ICD-10-CM

## 2024-03-12 PROCEDURE — 3077F SYST BP >= 140 MM HG: CPT | Performed by: FAMILY MEDICINE

## 2024-03-12 PROCEDURE — 3078F DIAST BP <80 MM HG: CPT | Performed by: FAMILY MEDICINE

## 2024-03-12 PROCEDURE — 3008F BODY MASS INDEX DOCD: CPT | Performed by: FAMILY MEDICINE

## 2024-03-12 PROCEDURE — 99213 OFFICE O/P EST LOW 20 MIN: CPT | Performed by: FAMILY MEDICINE

## 2024-03-12 PROCEDURE — 1159F MED LIST DOCD IN RCRD: CPT | Performed by: FAMILY MEDICINE

## 2024-03-12 RX ORDER — PEPPERMINT OIL 90 MG
CAPSULE, DELAYED, AND EXTENDED RELEASE ORAL
COMMUNITY
Start: 2024-03-12

## 2024-03-12 RX ORDER — LOSARTAN POTASSIUM AND HYDROCHLOROTHIAZIDE 25; 100 MG/1; MG/1
1 TABLET ORAL DAILY
Qty: 90 TABLET | Refills: 3 | Status: SHIPPED | OUTPATIENT
Start: 2024-03-12 | End: 2025-03-07

## 2024-03-12 NOTE — PROGRESS NOTES
Subjective:   Patient ID: Alma Rodriguez is a 84 year old female.    Hypertension  This is a chronic problem. The current episode started more than 1 year ago. The problem has been gradually worsening since onset. The problem is uncontrolled. Associated symptoms include anxiety. Pertinent negatives include no chest pain. There are no associated agents to hypertension. Risk factors for coronary artery disease include post-menopausal state and stress. Past treatments include angiotensin blockers. The current treatment provides moderate improvement. There is no history of CAD/MI or CVA.   Anxiety  Pertinent negatives include no chest pain.       History/Other:   Review of Systems   Cardiovascular:  Negative for chest pain.   Current Outpatient Medications   Medication Sig Dispense Refill   • losartan-hydroCHLOROthiazide 100-25 MG Oral Tab Take 1 tablet by mouth daily. 90 tablet 3   • Peppermint Oil (IBGARD) 90 MG Oral Cap CR Take by mouth.     • acetaminophen-codeine 300-30 MG Oral Tab Take 1 tablet by mouth daily as needed. 20 tablet 0   • atorvastatin 10 MG Oral Tab Take 1 tablet (10 mg total) by mouth nightly. 90 tablet 3   • Omeprazole 20 MG Oral Tab EC Take 20 mg by mouth in the morning and 20 mg before bedtime. 180 tablet 1   • cholecalciferol 25 MCG (1000 UT) Oral Tab      • acetaminophen 325 MG Oral Tab      • Cranberry 400 MG Oral Cap  60 capsule 5   • Fexofenadine HCl 180 MG Oral Tab Take 1 tablet (180 mg total) by mouth daily.     • lamoTRIgine 150 MG Oral Tab Take 1 tablet (150 mg total) by mouth daily. 90 tablet 1   • diazepam 2 MG Oral Tab Take 1 tablet (2 mg total) by mouth nightly as needed for Anxiety. 30 tablet 5     Allergies:  Allergies   Allergen Reactions   • Nitrofurantoin OTHER (SEE COMMENTS)     Lung fibrosis   • Penicillins UNKNOWN   • Sulfa Antibiotics RASH       Objective:   Physical Exam  Constitutional:       Appearance: Normal appearance.   Cardiovascular:      Rate and Rhythm:  Normal rate and regular rhythm.      Pulses: Normal pulses.      Heart sounds: Normal heart sounds.   Pulmonary:      Effort: Pulmonary effort is normal.      Breath sounds: Normal breath sounds.   Musculoskeletal:      Right lower leg: No edema.      Left lower leg: No edema.   Neurological:      Mental Status: She is alert.       Assessment & Plan:   1. Essential hypertension-blood pressure elevated here and on home blood pressures.  Continuing losartan 100 mg daily.  Plan to change to losartan//25 reviewed instructions and side effects of medication.  She will check home blood pressures and bring readings to follow-up appointment in 6 weeks.   2. Anxiety-experiencing significant anxiety recently with problems with health insurance, acceptance by PT, also some other financial issues.  Encouraged to take measures to correct these issues but mindfulness exercises to avoid excessive anxiety.   3. Frequent stools-has upcoming appointment with Dr. Tan.  Has about 3 bowel movements in the morning formed stool.  She is hesitant to leave the house until certain she does not need to go to the bathroom.  No abdominal pain, stool incontinence.  Recommend trial of IBgard, GI eval.       No orders of the defined types were placed in this encounter.      Meds This Visit:  Requested Prescriptions     Signed Prescriptions Disp Refills   • losartan-hydroCHLOROthiazide 100-25 MG Oral Tab 90 tablet 3     Sig: Take 1 tablet by mouth daily.       Imaging & Referrals:  None

## 2024-03-12 NOTE — PATIENT INSTRUCTIONS
BP Readings from Last 3 Encounters:   03/12/24 143/78   03/06/24 149/75   11/07/23 120/88   Today your provider asked you to monitor your blood pressure at home.  Your provider would like you to view the following links:    A list of recommended blood pressure cuffs:    https://www.validatebp.org/  An instructional video (available in Macedonian & English) on how to take your own blood pressure:   https://targetbp.org/tools_downloads/self-measured-blood-pressure-video/

## 2024-04-16 ENCOUNTER — NURSE TRIAGE (OUTPATIENT)
Dept: FAMILY MEDICINE CLINIC | Facility: CLINIC | Age: 84
End: 2024-04-16

## 2024-04-16 ENCOUNTER — LAB ENCOUNTER (OUTPATIENT)
Dept: LAB | Age: 84
End: 2024-04-16
Attending: FAMILY MEDICINE
Payer: MEDICARE

## 2024-04-16 DIAGNOSIS — R35.0 FREQUENT URINATION: ICD-10-CM

## 2024-04-16 DIAGNOSIS — R35.0 FREQUENT URINATION: Primary | ICD-10-CM

## 2024-04-16 LAB
BILIRUB UR QL: NEGATIVE
COLOR UR: YELLOW
GLUCOSE UR-MCNC: NORMAL MG/DL
KETONES UR-MCNC: NEGATIVE MG/DL
LEUKOCYTE ESTERASE UR QL STRIP.AUTO: 500
PH UR: 6 [PH] (ref 5–8)
PROT UR-MCNC: 100 MG/DL
RBC #/AREA URNS AUTO: >10 /HPF
SP GR UR STRIP: 1.02 (ref 1–1.03)
UROBILINOGEN UR STRIP-ACNC: NORMAL
WBC #/AREA URNS AUTO: >50 /HPF

## 2024-04-16 PROCEDURE — 87088 URINE BACTERIA CULTURE: CPT

## 2024-04-16 PROCEDURE — 87186 SC STD MICRODIL/AGAR DIL: CPT

## 2024-04-16 PROCEDURE — 81001 URINALYSIS AUTO W/SCOPE: CPT

## 2024-04-16 PROCEDURE — 87086 URINE CULTURE/COLONY COUNT: CPT

## 2024-04-16 RX ORDER — CIPROFLOXACIN 500 MG/1
500 TABLET, FILM COATED ORAL 2 TIMES DAILY
Qty: 10 TABLET | Refills: 0 | Status: SHIPPED | OUTPATIENT
Start: 2024-04-16 | End: 2024-04-21

## 2024-04-16 NOTE — TELEPHONE ENCOUNTER
Action Requested: Summary for Provider     []  Critical Lab, Recommendations Needed  [x] Need Additional Advice  []   FYI    [x]   Need Orders  [] Need Medications Sent to Pharmacy  []  Other     SUMMARY: Patient reports burning while urinating along with lower abdominal pain.   Denies: discharge, back pain, fever  Patient requesting a UA order.   Patient offered appt with available provider; however patient declined.   Patient advised she may go to  for eval; however patient would like message to be sent to provider.   States she has a ride for this morning only.   Message sent to provider for further recommendations.     Reason for call: Urinary Symptoms  Onset: A couple of days      Reason for Disposition   Age > 50 years    Protocols used: Urination Pain - Female-A-OH

## 2024-04-16 NOTE — TELEPHONE ENCOUNTER
Informed patient regarding the lab order and Dr He's recommendation.   [facility] lab office hours provided.  States she will asks her daughter to take her today , and if not, she will do it  first thing in the morning .

## 2024-04-16 NOTE — TELEPHONE ENCOUNTER
Orders entered for a urinalysis and culture.  Further recommendations pending results.  Push fluids.

## 2024-04-17 ENCOUNTER — PATIENT MESSAGE (OUTPATIENT)
Dept: FAMILY MEDICINE CLINIC | Facility: CLINIC | Age: 84
End: 2024-04-17

## 2024-04-17 RX ORDER — CEPHALEXIN 500 MG/1
500 TABLET ORAL 3 TIMES DAILY
Qty: 21 TABLET | Refills: 0 | Status: SHIPPED | OUTPATIENT
Start: 2024-04-17

## 2024-04-17 NOTE — TELEPHONE ENCOUNTER
Patient notified, verbalized understanding. Confirmed allergies. Patient would like to proceed with Cephalexin. Sent to pharmacy

## 2024-04-17 NOTE — TELEPHONE ENCOUNTER
Dr He: patient requesting a different Antibiotic.     See [employer] message copied below.  Patient states Dr. Sparrow ordered PT for her.     Alma Rodriguez   to P Em Triage Support (supporting Shikha He MD)     4/17/24 11:28 AM  I can’t take this - the first statement in prescription info Warning says danger to tendons up to, and including death.  I am in physical therapy right now for two torn tendons in my my right arm, with steroid injections. And I’m waaay older than 60.  This is very dangerous for my situation so I  need a different prescription.   Alma Loredo

## 2024-04-17 NOTE — TELEPHONE ENCOUNTER
Please review listed medication allergies with patient.  If they are all correct, there are not a lot of other choices.  If she is definitely unable to tolerate Macrobid/nitrofurantoin, our best choice would be cephalexin.  There is a small possibility approximately 5% of reaction to cephalexin with patients who have a history of penicillin allergy.  So, if she cannot take Macrobid/nitrofurantoin, please send Rx to pharmacy for cephalexin 500 mg 3 times a day for 7 days.  Call if side effects occur.

## 2024-04-24 ENCOUNTER — OFFICE VISIT (OUTPATIENT)
Facility: CLINIC | Age: 84
End: 2024-04-24

## 2024-04-24 VITALS — WEIGHT: 136.19 LBS | BODY MASS INDEX: 27.45 KG/M2 | HEIGHT: 59 IN

## 2024-04-24 DIAGNOSIS — Z12.11 ENCOUNTER FOR SCREENING COLONOSCOPY: ICD-10-CM

## 2024-04-24 DIAGNOSIS — R19.4 CHANGE IN BOWEL HABITS: ICD-10-CM

## 2024-04-24 DIAGNOSIS — K59.04 CHRONIC IDIOPATHIC CONSTIPATION: ICD-10-CM

## 2024-04-24 DIAGNOSIS — K21.9 GERD WITHOUT ESOPHAGITIS: Primary | ICD-10-CM

## 2024-04-24 PROCEDURE — 3008F BODY MASS INDEX DOCD: CPT | Performed by: INTERNAL MEDICINE

## 2024-04-24 PROCEDURE — 99204 OFFICE O/P NEW MOD 45 MIN: CPT | Performed by: INTERNAL MEDICINE

## 2024-04-24 NOTE — PATIENT INSTRUCTIONS
Common/significant causes for GERD:  Tomato SAUCES (cooked/simmered)  Some citrus - varies  Late large heavy dinners - try to eat by 7 - 7:30pm  Oily/fried meals  Okay to drink water in the evenings  Peppermint oil sometimes helps sometimes aggravates      Don't worry:  Coffee in morning/late morning is okay  Chocolate in small helpings is okay  Fresh raw tomatoes are safe/healthy    DO take TUMS as needed for symptoms - good for you    Try taking the evening dose of omeprazole around 5pm    Try lactose free milk \"LACTAID\" or  Joes equivalent    Try for your bowel patterns:    A.  Daily dose of fiber powder - helps some people and makes some people worse (diarrhea or bloating).  Only the POWDERS work, not capsules or gummies.  -  Metamucil 1-2 heaping tablespoons in water or juice once or twice daily , also comes as pills which aren't as effective - take 4-6 pills per day  -  Best: Konsyl fiber powder (unflavored - no sugar or flavor)(white and red plastic Ziploc bag/package) -- usually not on pharmacy shelves, often need to order for shipment or in store pickup at [facility], [facility] or [facility], or of course [employer] - 1 heaping tablespoon in water or juice once or twice per day; or    -  Benefiber (same) (synthetic; less bloating)    -  Viki's Tummy Fiber - (internet) organic landry    B. Stronger natural remedies -   \"Calm\" magnesium product: Gentle laxative and apparently magnesium helps with sleep      Stomach endoscopy is optional.  Call us if you want to schedule.

## 2024-04-24 NOTE — PROGRESS NOTES
** Scroll down for HPI. **    ASSESSMENT/PLAN:     84 year old woman looking fit and healthy, history hypertension dyslipidemia and a long history of GERD symptoms returns today with her very supportive daughter for follow-up.    Initially saw Agnes Hodgson NP of our group 5 years ago; reassuring colonoscopy examination with me November 2018 as below.    Ms. Rodriguez and her daughter return today for follow-up regarding GERD symptoms, mild change in bowel patterns.    Suggest:    GERD symptoms without dysphagia  Overall doing well on omeprazole 20 mg BID dosing  Fairly severe chronic GERD symptoms discussed today  See today's discharge instructions  I encouraged taking occasional Tums calcium carbonate as needed.  This will help with the GERD, good for her bones, may help slow down her bowels  Change evening dose of omeprazole 20 mg to 5-6 PM  Reported previous EGD examination mid 2018 apparently showed small uncomplicated hiatal hernia, mild gastritis only  At this point I described EGD examination as optional in this healthy non-smoker.  Last exam approximately 2015 was reassuring by her recollection.  She defers EGD examination at this point.    2.  Change in bowel patterns, mild chronic constipation now increased frequency stools, abdominal bloating discomfort  Try lactose-free/Lactaid milk  Watch for dietary triggers  Daily bowel regimen as per discharge instructions    3.  Colon cancer screening, average risk  No colon polyps on 2008 colonoscopy examination or the November 2018 colonoscopy examination  Next screening colonoscopy examination previously recommended November 2025-November 2028          Office visit 4/24/2024:  HPI:    Patient ID: Alma Rodriguez is a 84 year old woman looking fit and healthy, history hypertension dyslipidemia and a long history of GERD symptoms returns today with her very supportive daughter for follow-up.    Initially saw Agnes Hodgson NP of our group 5 years ago;  reassuring colonoscopy examination with me November 2018 as below.    Ms. Rodriguez and her daughter return today for follow-up regarding GERD symptoms, mild change in bowel patterns.    1.  GERD symptoms    This is truly Ms. Rodriguez's chief complaint.  It sounds like she feels some degree of burning heartburn, burning reflux almost every day.    Currently takes omeprazole 20 mg twice daily.  Takes a second dose around bedtime.  She has briefly been prescribed Nexium in the past.    Even on the omeprazole, Ms. Rodriguez describes heartburn symptoms several times per week, usually in the mornings.  She describes burning acid, upset stomach, occasional vomiting.    Morning symptoms are relieved by something simple as a glass of milk, certainly a dose of Tums.    Unusually strict in her dietary restrictions trying to avoid the GERD symptoms.  She actually been afraid to take Tums thinking it would be bad for her.  Does sleep on extra pillows.    We discussed diet and lifestyle measures as per today's discharge handout.      2.  Change in bowel patterns.    Ms. Rodriguez describes a slight increase in her stool output.  She passes about 3 bowel movements over the course of each morning which are formed, but soft but never loose not urgency.    First bowel movement starts pretty early, as early as 4 AM which has her concerned.  That does interfere with getting enough sleep.  She passes about 3 bowel movements over the course of the morning.    Recently recommended the peppermint oil, IBgard products.    On review of systems, this is not diarrhea or urgency.  No blood with the stools.  No dysphagia with the GERD symptoms.      Former tobacco smoker, quit over 30 years ago  No family history colorectal cancer.      Recent labs:  CBC 11/7/2023 normal with hemoglobin 12.7g MCV 94.7  Normal renal function  AST 22 ALT 20 alk phosphatase 54  TSH 1.186    Wt Readings from Last 20 Encounters:   04/24/24 136 lb 3.2 oz (61.8 kg)   03/12/24  133 lb 6.4 oz (60.5 kg)   03/06/24 135 lb (61.2 kg)   11/07/23 135 lb (61.2 kg)   07/05/23 139 lb (63 kg)   05/08/23 138 lb 6 oz (62.8 kg)   04/19/23 142 lb (64.4 kg)   01/03/23 134 lb (60.8 kg)   11/14/22 131 lb (59.4 kg)   11/10/22 132 lb (59.9 kg)   09/15/22 129 lb (58.5 kg)   06/07/22 133 lb (60.3 kg)   09/22/21 135 lb (61.2 kg)   05/27/21 136 lb 3.2 oz (61.8 kg)   09/21/20 139 lb (63 kg)   09/23/19 133 lb 6.4 oz (60.5 kg)   03/11/19 130 lb 9.6 oz (59.2 kg)   11/07/18 127 lb (57.6 kg)   10/18/18 128 lb 6.4 oz (58.2 kg)   10/01/18 124 lb 12.8 oz (56.6 kg)         Previous EGD examination: Approx 2015   Previous colonoscopy(ies): 2018    HPI    Review of Systems    ======================  Previous visit Agnesmirna Sawyerley 10/18/2018:    Alma Rodriguez is a 78 year old year-old female pt of Dr. He with history of GERD/hiatal hernia, hypertension, hyperlipidemia, lumbar radiculopathy, bipolar disorder, anxiety, depression, who presents for evaluation of unexpected weight loss.     Patient presents with her daughter who was present for the duration of the interview and physical exam.           Patient presents at the request of her PCP for evaluation of GERD, weight loss, and to discuss a screening colonoscopy.     She recently moved from [facility] to the [facility].  She reports an EGD which took place approximately 3 months ago.  Records are not available for review.  She also reports lab work within the last year though these records are also not available at the time of the office visit.     She was started on omeprazole 20 mg daily following her EGD.  This resolved her GERD symptoms.  After she stopped taking the medication she noticed a recurrence of symptoms.  She restarted omeprazole on her own initiative which has been helping her symptoms.  She reports very rare breakthrough symptoms despite the omeprazole.  This is responsive to Tums.     No other associated upper GI symptoms including nausea,  vomiting, hematemesis, dysphagia, odynophagia.  No chest pain or shortness of breath.      She reports a colonoscopy about 10 years ago also completed in [place] though these records are not available.  She denies any findings including colon polyps.  No significant family history of GI cancers or chronic GI problems.     She reports concern regarding her weight.  Her weight had changed from a the scale at her PCPs office in [place] compared with her newly established PCP in [place].  Today in office her weight is up 228 pounds.  She reports her baseline is 131 pounds.  She does not on a scale at home and does not weigh herself regularly.       She believes this change in weight may have been due to decreased appetite/eating as she would often \"forgets to eat lunch\" due to recent stressors.  She admits to being a lifelong \"very anxious person\" stating that anxiety runs in multiple members of her family     She also complains of intermittent constipation x 2 years.  This happens very occasionally, possibly 6-8 times in the last 2 years.  It is responsive to MiraLAX.  Reports her dietary fiber intake is moderately good.  Her water intake could be improved.  Denies associated abdominal pain, other changes in bowel habits recently, hematochezia, or melena.           Pertinent Surgical Hx:  No abdominal surgery  - See additional surgical hx below  - No known issues with sedation.  - No known history of sleep apnea.     Pertinent Family Hx:  - No family hx of esophageal, gastric or colon cancer  - No family history of IBD.     Prior endoscopies:  ~3 months ago EGD performed by unspecified GI practice in [place] r/t GERD, findings per pt: small hiatal hernia, gastritis, on omeprazole x 30 days      ~10 years ago colonoscopy performed by unspecified GI practice, findings per patient: Unremarkable     Social Hx:  + Former smoker, quit 15-20 years ago  - No Etoh  - Denies illicit drug use   - LMP: Postmenopausal  - Occupation:  Retired  - Lives with daughter  - NSAIDs/ASA use: Occasionally    ======================  3/07/2023: CT CHEST(CONTRAST ONLY) (CPT=71260)     COMPARISON: [facility], CT CARDIAC OVER READ, 11/14/2022, 11:46 AM.     INDICATIONS: R59.0 Mediastinal adenopathy     TECHNIQUE: CT images of the chest were obtained with non-ionic intravenous contrast material.      FINDINGS:  Mediastinum:  Multiple subcentimeter bilateral mediastinal and hilar lymph nodes are present measuring up to 8 millimeter in size.  No adenopathy  Heart:  Normal size heart and great vessels.  Normal pericardium.  Lungs and pleura:  There is mild symmetric peripheral fibrosis in the mid to upper lungs, apical predominant.  This is accompanied by mild areas of biapical pleural thickening.  Minimal peripheral fibrosis posterior right mid lung.  No basilar fibrosis.   Focal air trapping right lung base.  No diffuse mosaicism.  No bronchiectasis or nodules or cysts.  No ground-glass opacity.  There is no pulmonary mass.  Bones:  No fractures or lesions  Upper abdomen:  Partly visible 1.8 centimeter cyst in the superior right kidney and a subcentimeter cyst in the left kidney        CONCLUSION:      Mild peripheral fibrosis throughout both upper lungs. Differential diagnosis: pleuroparenchymal fibroelastosis (PPFE), chronic hypersensitivity pneumonitis (chronic HP),  chronic nitrofurantoin use.     Stable multiple subcentimeter lymph nodes throughout the bilateral mediastinum and marlene, most likely secondary to the underlying pulmonary fibrosis            Dictated by (CST): Denny Webb MD on 3/07/2023 at 12:27 PM         ======================    COLONOSCOPY PROCEDURE REPORT     DATE OF PROCEDURE:  11/7/2018      PCP: Shikha He MD     PREOPERATIVE DIAGNOSIS: Screening colonoscopy examination     POSTOPERATIVE DIAGNOSIS:  See impression.     SURGEON:  Abdoul Tan M.D.     SEDATION:    MAC anesthesia provided by the Anesthesia  Service.  MAC anesthesia requested due to history of anxiety, anticipated intolerance of colonoscopy examination under conscious sedation medications.     COLONOSCOPY PROCEDURE:   After the nature and risks of colonoscopy examination under MAC anesthesia were discussed with the patient and all questions answered, informed consent was obtained.  The patient was sedated as above.       Digital rectal exam was performed which showed no masses.  The Olympus pediatric video colonoscope was placed in the patient's rectum and advanced under direct visualization through the entire length of the colon up to the cecum and terminal ileum.  Retroflexed exam performed up the ascending colon to the hepatic flexure.  The cecum was confirmed by landmarks including appendiceal orifice, cecal strap, ileocecal valve.  Retroflexion was performed in the rectum.     The quality of the prep was good.     COLONOSCOPY FINDINGS:    Mild colonic diverticulosis ascending colon; mild-moderate diverticulosis descending and sigmoid colon.  Small internal hemorrhoids.     RECOMMENDATIONS:  High fiber diet.  Follow-up with our office as needed.  Follow-up colon screening by stool testing or colonoscopy as per patient's clinical condition in 7-10 years.       Current Outpatient Medications   Medication Sig Dispense Refill    Cephalexin 500 MG Oral Tab Take 500 mg by mouth 3 (three) times daily. 21 tablet 0    losartan-hydroCHLOROthiazide 100-25 MG Oral Tab Take 1 tablet by mouth daily. 90 tablet 3    Peppermint Oil (IBGARD) 90 MG Oral Cap CR Take by mouth.      acetaminophen-codeine 300-30 MG Oral Tab Take 1 tablet by mouth daily as needed. 20 tablet 0    atorvastatin 10 MG Oral Tab Take 1 tablet (10 mg total) by mouth nightly. 90 tablet 3    Omeprazole 20 MG Oral Tab EC Take 20 mg by mouth in the morning and 20 mg before bedtime. 180 tablet 1    cholecalciferol 25 MCG (1000 UT) Oral Tab       acetaminophen 325 MG Oral Tab       Cranberry 400 MG  Oral Cap  60 capsule 5    Fexofenadine HCl 180 MG Oral Tab Take 1 tablet (180 mg total) by mouth daily.      lamoTRIgine 150 MG Oral Tab Take 1 tablet (150 mg total) by mouth daily. 90 tablet 1    diazepam 2 MG Oral Tab Take 1 tablet (2 mg total) by mouth nightly as needed for Anxiety. 30 tablet 5         Allergies:  Allergies   Allergen Reactions    Nitrofurantoin OTHER (SEE COMMENTS)     Lung fibrosis    Penicillins UNKNOWN    Sulfa Antibiotics RASH     Imaging: No results found.       PHYSICAL EXAM:   Physical Exam                   Prior to this encounter, I spent over 10 minutes preparing for the visit, including reviewing documents from other specialties as well as from PCP and going over test results. During and after the face to face encounter, I spent an additional 35 minutes discussing the above and counseling this patient, reviewing chart notes and data with patient and composing this note.       Digital transcription software was utilized to produce this note. The note was proofread for content only. Typographical errors may remain.        Meds This Visit:  Requested Prescriptions      No prescriptions requested or ordered in this encounter       Imaging & Referrals:  None       ID#[id number]

## 2024-06-06 DIAGNOSIS — M54.16 LUMBAR RADICULOPATHY: ICD-10-CM

## 2024-06-10 RX ORDER — ACETAMINOPHEN AND CODEINE PHOSPHATE 300; 30 MG/1; MG/1
1 TABLET ORAL DAILY PRN
Qty: 20 TABLET | Refills: 0 | Status: SHIPPED | OUTPATIENT
Start: 2024-06-10

## 2024-06-10 NOTE — TELEPHONE ENCOUNTER
Please review; protocol failed/ has no protocol      Recent fill: 02/06/2024  Last Rx written: 02/06/2024  Last Office Visit: 03/12/2024    Please see message below for upcoming appointment.    Future Appointments   Date Time Provider Department Center   6/13/2024  9:00 AM Shikha He MD [facility]   6/13/2024  3:00 PM [facility] CT RM1 CARD [facility] CT EM Main [place]                Requested Prescriptions   Pending Prescriptions Disp Refills    acetaminophen-codeine 300-30 MG Oral Tab 20 tablet 0     Sig: Take 1 tablet by mouth daily as needed.       Controlled Substance Medication Failed - 6/6/2024 10:43 AM        Failed - This medication is a controlled substance - forward to provider to refill           Recent Outpatient Visits              1 month ago GERD without esophagitis    [facility] Abdoul Tan MD    Office Visit    3 months ago Essential hypertension    [facility] Shikha He MD    Office Visit    3 months ago ILD (interstitial lung disease) ([facility])    [facility] Kim Zaman MD    Office Visit    7 months ago Encounter for annual health examination    [facility] Shikha He MD    Office Visit    11 months ago ILD (interstitial lung disease) ([facility])    [facility] Kim Zaman MD    Office Visit          Future Appointments         Provider Department Appt Notes    In 3 days Shikha He MD [facility] Blood pressure, sciatic pain, policy informed    In 3 days [facility] CT RM1 CARD [facility] CT Whatever Dr Zaman wants.    In 4 months Kim Zaman MD [facility] Chest scan

## 2024-06-13 ENCOUNTER — HOSPITAL ENCOUNTER (OUTPATIENT)
Dept: CT IMAGING | Facility: HOSPITAL | Age: 84
Discharge: HOME | End: 2024-06-13
Attending: INTERNAL MEDICINE
Payer: MEDICARE

## 2024-06-13 ENCOUNTER — OFFICE VISIT (OUTPATIENT)
Dept: FAMILY MEDICINE CLINIC | Facility: CLINIC | Age: 84
End: 2024-06-13
Payer: COMMERCIAL

## 2024-06-13 VITALS
RESPIRATION RATE: 18 BRPM | DIASTOLIC BLOOD PRESSURE: 77 MMHG | HEART RATE: 67 BPM | SYSTOLIC BLOOD PRESSURE: 128 MMHG | WEIGHT: 134 LBS | OXYGEN SATURATION: 98 % | HEIGHT: 59 IN | BODY MASS INDEX: 27.01 KG/M2

## 2024-06-13 DIAGNOSIS — S90.211A CONTUSION OF RIGHT GREAT TOE WITH DAMAGE TO NAIL, INITIAL ENCOUNTER: ICD-10-CM

## 2024-06-13 DIAGNOSIS — I10 ESSENTIAL HYPERTENSION: Primary | ICD-10-CM

## 2024-06-13 DIAGNOSIS — J84.9 ILD (INTERSTITIAL LUNG DISEASE) (HCC): ICD-10-CM

## 2024-06-13 DIAGNOSIS — M54.16 LUMBAR RADICULOPATHY: ICD-10-CM

## 2024-06-13 PROCEDURE — 3078F DIAST BP <80 MM HG: CPT | Performed by: FAMILY MEDICINE

## 2024-06-13 PROCEDURE — 99213 OFFICE O/P EST LOW 20 MIN: CPT | Performed by: FAMILY MEDICINE

## 2024-06-13 PROCEDURE — G2211 COMPLEX E/M VISIT ADD ON: HCPCS | Performed by: FAMILY MEDICINE

## 2024-06-13 PROCEDURE — 3074F SYST BP LT 130 MM HG: CPT | Performed by: FAMILY MEDICINE

## 2024-06-13 PROCEDURE — 71250 CT THORAX DX C-: CPT | Performed by: INTERNAL MEDICINE

## 2024-06-13 PROCEDURE — 3008F BODY MASS INDEX DOCD: CPT | Performed by: FAMILY MEDICINE

## 2024-06-13 RX ORDER — LOSARTAN POTASSIUM 100 MG/1
100 TABLET ORAL DAILY
Qty: 90 TABLET | Refills: 3 | Status: SHIPPED | OUTPATIENT
Start: 2024-06-13

## 2024-06-13 NOTE — PROGRESS NOTES
Subjective:   Patient ID: Alma Rodriguez is a 84 year old female.    Patient presents to follow-up on hypertension and issues as below.    Hip Pain   This is a chronic problem. The current episode started more than 1 month ago. The problem has been waxing and waning.   Toe Injury   The incident occurred more than 1 week ago. The incident occurred at home. The injury mechanism was a direct blow.       History/Other:   Review of Systems  Current Outpatient Medications   Medication Sig Dispense Refill    losartan 100 MG Oral Tab Take 1 tablet (100 mg total) by mouth daily. 90 tablet 3    acetaminophen-codeine 300-30 MG Oral Tab Take 1 tablet by mouth daily as needed. 20 tablet 0    Peppermint Oil (IBGARD) 90 MG Oral Cap CR Take by mouth.      atorvastatin 10 MG Oral Tab Take 1 tablet (10 mg total) by mouth nightly. 90 tablet 3    Omeprazole 20 MG Oral Tab EC Take 20 mg by mouth in the morning and 20 mg before bedtime. 180 tablet 1    cholecalciferol 25 MCG (1000 UT) Oral Tab       acetaminophen 325 MG Oral Tab       Cranberry 400 MG Oral Cap  60 capsule 5    Fexofenadine HCl 180 MG Oral Tab Take 1 tablet (180 mg total) by mouth daily.      lamoTRIgine 150 MG Oral Tab Take 1 tablet (150 mg total) by mouth daily. 90 tablet 1    diazepam 2 MG Oral Tab Take 1 tablet (2 mg total) by mouth nightly as needed for Anxiety. 30 tablet 5     Allergies:  Allergies   Allergen Reactions    Nitrofurantoin OTHER (SEE COMMENTS)     Lung fibrosis    Penicillins UNKNOWN    Sulfa Antibiotics RASH       Objective:   Physical Exam  Constitutional:       Appearance: Normal appearance.   Cardiovascular:      Rate and Rhythm: Normal rate and regular rhythm.      Pulses: Normal pulses.      Heart sounds: Normal heart sounds.   Pulmonary:      Effort: Pulmonary effort is normal.      Breath sounds: Normal breath sounds.   Musculoskeletal:      Right lower leg: No edema.      Left lower leg: No edema.   Skin:     Comments: See below    Neurological:      Mental Status: She is alert.         Assessment & Plan:   1. Essential hypertension-at last visit we added HCT to losartan.  However she checked home blood pressures while taking losartan alone and they were excellent.  She is continued on just losartan.  Plan to continue this, periodic home blood pressure checks, and recheck at the time of routine physical in 4 to 5 months.   2. Contusion of right great toe with damage to nail, initial encounter-struck right great toe about 2 weeks ago.  Lateral distal portion of nail injured/broken.  No evidence of infection.  Just expected resolution.   3. Lumbar radiculopathy-followed by Dr. Sparrow.  Recent worsening.  Has been trying yoga at home for sciatica.  Had recent episode of vertigo after laying flat on the ground, this caused a fall with contusions.  Has follow-up appointment with Dr. Sparrow next week.  In the meantime continue Tylenol, ice/heat, pain patches as needed.  Tylenol 3 for most severe pain but fall precautions.       No orders of the defined types were placed in this encounter.      Meds This Visit:  Requested Prescriptions     Signed Prescriptions Disp Refills    losartan 100 MG Oral Tab 90 tablet 3     Sig: Take 1 tablet (100 mg total) by mouth daily.       Imaging & Referrals:  None

## 2024-08-03 DIAGNOSIS — M54.16 LUMBAR RADICULOPATHY: ICD-10-CM

## 2024-08-07 RX ORDER — ACETAMINOPHEN AND CODEINE PHOSPHATE 300; 30 MG/1; MG/1
1 TABLET ORAL DAILY PRN
Qty: 20 TABLET | Refills: 0 | Status: SHIPPED | OUTPATIENT
Start: 2024-08-07

## 2024-08-07 NOTE — TELEPHONE ENCOUNTER
Medication(s) to Refill:   Requested Prescriptions     Pending Prescriptions Disp Refills    acetaminophen-codeine 300-30 MG Oral Tab 20 tablet 0     Sig: Take 1 tablet by mouth daily as needed.         Reason for Medication Refill being sent to Provider / Reason Protocol Failed:  [x] Controlled Substance         Last Time Medication was Filled:  06/10/2024      Last Office Visit 06/13/2024    Future Appointments   Date Time Provider Department Center   11/4/2024  9:30 AM Raslan, Ahmad O, MD [facility]         Last Blood Pressures:  BP Readings from Last 2 Encounters:   06/13/24 128/77   03/12/24 143/78           Requested Prescriptions   Pending Prescriptions Disp Refills    acetaminophen-codeine 300-30 MG Oral Tab 20 tablet 0     Sig: Take 1 tablet by mouth daily as needed.       Controlled Substance Medication Failed - 8/3/2024  8:38 PM        Failed - This medication is a controlled substance - forward to provider to refill               Future Appointments         Provider Department Appt Notes    In 2 months Kim Zaman MD [facility] Chest scan          Recent Outpatient Visits              1 month ago Essential hypertension    [facility]    Office Visit    3 months ago GERD without esophagitis    [facility] Abdoul Tan MD    Office Visit    4 months ago Essential hypertension    [facility] [place]    Office Visit    5 months ago ILD (interstitial lung disease) (HCC)    [facility] Kim Zaman MD    Office Visit    9 months ago Encounter for annual health examination    [facility] [place]    Office Visit

## 2024-10-16 ENCOUNTER — PATIENT MESSAGE (OUTPATIENT)
Dept: FAMILY MEDICINE CLINIC | Facility: CLINIC | Age: 84
End: 2024-10-16

## 2024-10-17 NOTE — TELEPHONE ENCOUNTER
.Patient calling, back, verified name and date of birth.  Patient requesting physical appointment:  Future Appointments   Date Time Provider Department Center   11/4/2024  9:30 AM Kim Zaman MD [facility]   11/29/2024  8:30 AM Shikha He MD [facility]

## 2024-10-17 NOTE — TELEPHONE ENCOUNTER
mychart message sent to patient,await reply.   To reception staff, pls call pt for physical appt.   Thanks              Future Appointments   Date Time Provider Department Center   11/4/2024  9:30 AM Raslan, Ahmad O, MD [facility]

## 2024-11-04 ENCOUNTER — OFFICE VISIT (OUTPATIENT)
Dept: PULMONOLOGY | Facility: CLINIC | Age: 84
End: 2024-11-04
Payer: COMMERCIAL

## 2024-11-04 VITALS
OXYGEN SATURATION: 99 % | SYSTOLIC BLOOD PRESSURE: 131 MMHG | DIASTOLIC BLOOD PRESSURE: 72 MMHG | BODY MASS INDEX: 27.01 KG/M2 | HEART RATE: 81 BPM | HEIGHT: 59 IN | WEIGHT: 134 LBS

## 2024-11-04 DIAGNOSIS — J84.9 ILD (INTERSTITIAL LUNG DISEASE) (HCC): Primary | ICD-10-CM

## 2024-11-04 PROCEDURE — 3075F SYST BP GE 130 - 139MM HG: CPT | Performed by: INTERNAL MEDICINE

## 2024-11-04 PROCEDURE — 99214 OFFICE O/P EST MOD 30 MIN: CPT | Performed by: INTERNAL MEDICINE

## 2024-11-04 PROCEDURE — 3078F DIAST BP <80 MM HG: CPT | Performed by: INTERNAL MEDICINE

## 2024-11-04 PROCEDURE — 3008F BODY MASS INDEX DOCD: CPT | Performed by: INTERNAL MEDICINE

## 2024-11-04 NOTE — PROGRESS NOTES
Subjective:   Patient ID: Alma Rodriguez is a 84 year old female.    HPI  Doing very well overall with no dyspnea or cough or wheeze  Chronic sinus allergy and doing well on Claritin  Good appetite and stable weight  No weight loss or night sweats  No fever or chills  History/Other:   Review of Systems   Constitutional: Negative.    Eyes: Negative.    Respiratory: Negative.     Cardiovascular: Negative.    Gastrointestinal: Negative.    Musculoskeletal: Negative.    Skin: Negative.    Neurological: Negative.    Hematological: Negative.    Psychiatric/Behavioral: Negative.       Current Outpatient Medications   Medication Sig Dispense Refill    acetaminophen-codeine 300-30 MG Oral Tab Take 1 tablet by mouth daily as needed. 20 tablet 0    losartan 100 MG Oral Tab Take 1 tablet (100 mg total) by mouth daily. 90 tablet 3    Peppermint Oil (IBGARD) 90 MG Oral Cap CR Take by mouth.      atorvastatin 10 MG Oral Tab Take 1 tablet (10 mg total) by mouth nightly. 90 tablet 3    Omeprazole 20 MG Oral Tab EC Take 20 mg by mouth in the morning and 20 mg before bedtime. 180 tablet 1    cholecalciferol 25 MCG (1000 UT) Oral Tab       acetaminophen 325 MG Oral Tab       Cranberry 400 MG Oral Cap  60 capsule 5    Fexofenadine HCl 180 MG Oral Tab Take 1 tablet (180 mg total) by mouth daily.      lamoTRIgine 150 MG Oral Tab Take 1 tablet (150 mg total) by mouth daily. 90 tablet 1    diazepam 2 MG Oral Tab Take 1 tablet (2 mg total) by mouth nightly as needed for Anxiety. 30 tablet 5     Allergies:Allergies[1]    Objective:   Physical Exam  Constitutional:       General: She is not in acute distress.     Appearance: Normal appearance. She is not ill-appearing.   HENT:      Head: Normocephalic and atraumatic.      Nose: Nose normal.      Mouth/Throat:      Mouth: Mucous membranes are moist.   Eyes:      General: No scleral icterus.     Pupils: Pupils are equal, round, and reactive to light.   Cardiovascular:      Rate and  Rhythm: Normal rate.      Heart sounds: No murmur heard.     No gallop.   Pulmonary:      Effort: No respiratory distress.      Breath sounds: No stridor. No wheezing, rhonchi or rales.   Chest:      Chest wall: No tenderness.   Abdominal:      General: Abdomen is flat. Bowel sounds are normal. There is no distension.      Palpations: Abdomen is soft.   Musculoskeletal:      Cervical back: Normal range of motion.      Right lower leg: No edema.      Left lower leg: No edema.   Skin:     General: Skin is dry.   Neurological:      Mental Status: She is oriented to person, place, and time.             Chest ct June /2024 reviewed :     Impression   CONCLUSION:  1. Mild peripheral interstitial reticular fibrosis without evidence of honeycombing.     2. No acute intrathoracic process is identified.     3. Lesser incidental findings as above.        Assessment & Plan:   1. ILD (interstitial lung disease) (HCC)        1- mild ILD  / stable   Mild apical peripheral fibrotic changes ,  No other suspicious finding , not a UIP / IPF pattern   Stable clinically and radiologically / last chest ct June/2024 stable     chronic /CXR  2019 showed COPD and some fibrotic changes  Asymptomatic with normal lung exam normal O2 sat on room air  Slight reduction in DLCO 67% otherwise normal PFTs     Former smoker quit around year 2000 /prior minimal history of smoking  No occupational exposure  No pets exposure  No other obvious triggers or exposures  No clinical evidence of autoimmune disease     F/u in 1 year        Meds This Visit:  Requested Prescriptions      No prescriptions requested or ordered in this encounter       Imaging & Referrals:  None         [1]   Allergies  Allergen Reactions    Nitrofurantoin OTHER (SEE COMMENTS)     Lung fibrosis    Penicillins UNKNOWN    Sulfa Antibiotics RASH

## 2024-11-15 RX ORDER — ATORVASTATIN CALCIUM 10 MG/1
10 TABLET, FILM COATED ORAL NIGHTLY
Qty: 90 TABLET | Refills: 3 | Status: SHIPPED | OUTPATIENT
Start: 2024-11-15

## 2024-11-15 NOTE — TELEPHONE ENCOUNTER
Please review. Protocol failed / No Protocol.    LOV 6/2024    Future Appointments   Date Time Provider Department Center   1/2/2025  8:30 AM [facility]   11/3/2025  9:30 AM [facility]         Requested Prescriptions   Pending Prescriptions Disp Refills    ATORVASTATIN 10 MG Oral Tab [Pharmacy Med Name: ATORVASTATIN 10MG TABLETS] 90 tablet 3     Sig: TAKE 1 TABLET(10 MG) BY MOUTH EVERY NIGHT       Cholesterol Medication Protocol Failed - 11/14/2024  8:09 PM        Failed - ALT < 80     Lab Results   Component Value Date    ALT 20 11/07/2023             Failed - ALT resulted within past year        Failed - Lipid panel within past 12 months     Lab Results   Component Value Date    CHOLEST 156 11/07/2023    TRIG 187 (H) 11/07/2023    HDL 45 11/07/2023    LDL 79 11/07/2023    VLDL 29 11/07/2023    NONHDLC 111 11/07/2023             Passed - In person appointment or virtual visit in the past 12 mos or appointment in next 3 mos     Recent Outpatient Visits              1 week ago ILD (interstitial lung disease) (HCC)    [facility] Kim Zaman MD    Office Visit    5 months ago Essential hypertension    [facility] Shikha He MD    Office Visit    6 months ago GERD without esophagitis    [facility] Abdoul Tan MD    Office Visit    8 months ago Essential hypertension    [facility] Shikha He MD    Office Visit    8 months ago ILD (interstitial lung disease) (HCC)    [facility] Kim Zaman MD    Office Visit          Future Appointments         Provider Department Appt Notes    In 1 month Shikha He MD [facility] Appetite and weight loss.    In 11 months Kim Zaman,  MD [facility] 12 months

## 2025-01-02 ENCOUNTER — LAB ENCOUNTER (OUTPATIENT)
Dept: LAB | Age: 85
End: 2025-01-02
Attending: FAMILY MEDICINE
Payer: MEDICARE

## 2025-01-02 ENCOUNTER — OFFICE VISIT (OUTPATIENT)
Dept: FAMILY MEDICINE CLINIC | Facility: CLINIC | Age: 85
End: 2025-01-02
Payer: MEDICARE

## 2025-01-02 VITALS
HEART RATE: 75 BPM | WEIGHT: 130 LBS | SYSTOLIC BLOOD PRESSURE: 176 MMHG | TEMPERATURE: 96 F | DIASTOLIC BLOOD PRESSURE: 72 MMHG | OXYGEN SATURATION: 98 % | HEIGHT: 60.63 IN | BODY MASS INDEX: 24.87 KG/M2

## 2025-01-02 DIAGNOSIS — I10 ESSENTIAL HYPERTENSION: ICD-10-CM

## 2025-01-02 DIAGNOSIS — M81.0 AGE-RELATED OSTEOPOROSIS WITHOUT CURRENT PATHOLOGICAL FRACTURE: ICD-10-CM

## 2025-01-02 DIAGNOSIS — K21.9 GERD WITHOUT ESOPHAGITIS: ICD-10-CM

## 2025-01-02 DIAGNOSIS — K92.1 BLOOD IN STOOL: ICD-10-CM

## 2025-01-02 DIAGNOSIS — Z00.00 ENCOUNTER FOR ANNUAL HEALTH EXAMINATION: Primary | ICD-10-CM

## 2025-01-02 DIAGNOSIS — M54.16 LUMBAR RADICULOPATHY: ICD-10-CM

## 2025-01-02 DIAGNOSIS — R63.4 UNINTENTIONAL WEIGHT LOSS: ICD-10-CM

## 2025-01-02 DIAGNOSIS — F31.70 BIPOLAR AFFECTIVE DISORDER IN REMISSION (HCC): ICD-10-CM

## 2025-01-02 DIAGNOSIS — K64.4 EXTERNAL HEMORRHOIDS: ICD-10-CM

## 2025-01-02 DIAGNOSIS — E78.5 HYPERLIPIDEMIA, UNSPECIFIED HYPERLIPIDEMIA TYPE: ICD-10-CM

## 2025-01-02 DIAGNOSIS — J84.9 ILD (INTERSTITIAL LUNG DISEASE) (HCC): ICD-10-CM

## 2025-01-02 LAB
ALBUMIN SERPL-MCNC: 4.8 G/DL (ref 3.2–4.8)
ALBUMIN/GLOB SERPL: 1.9 {RATIO} (ref 1–2)
ALP LIVER SERPL-CCNC: 49 U/L
ALT SERPL-CCNC: 18 U/L
ANION GAP SERPL CALC-SCNC: 5 MMOL/L (ref 0–18)
AST SERPL-CCNC: 22 U/L (ref ?–34)
BASOPHILS # BLD AUTO: 0.04 X10(3) UL (ref 0–0.2)
BASOPHILS NFR BLD AUTO: 0.5 %
BILIRUB SERPL-MCNC: 0.5 MG/DL (ref 0.2–1.1)
BUN BLD-MCNC: 22 MG/DL (ref 9–23)
BUN/CREAT SERPL: 27.2 (ref 10–20)
CALCIUM BLD-MCNC: 10.6 MG/DL (ref 8.7–10.4)
CHLORIDE SERPL-SCNC: 106 MMOL/L (ref 98–112)
CHOLEST SERPL-MCNC: 167 MG/DL (ref ?–200)
CO2 SERPL-SCNC: 28 MMOL/L (ref 21–32)
CREAT BLD-MCNC: 0.81 MG/DL
DEPRECATED RDW RBC AUTO: 44.9 FL (ref 35.1–46.3)
EGFRCR SERPLBLD CKD-EPI 2021: 72 ML/MIN/1.73M2 (ref 60–?)
EOSINOPHIL # BLD AUTO: 0.14 X10(3) UL (ref 0–0.7)
EOSINOPHIL NFR BLD AUTO: 1.7 %
ERYTHROCYTE [DISTWIDTH] IN BLOOD BY AUTOMATED COUNT: 12.6 % (ref 11–15)
FASTING PATIENT LIPID ANSWER: YES
FASTING STATUS PATIENT QL REPORTED: YES
GLOBULIN PLAS-MCNC: 2.5 G/DL (ref 2–3.5)
GLUCOSE BLD-MCNC: 101 MG/DL (ref 70–99)
HCT VFR BLD AUTO: 39.5 %
HDLC SERPL-MCNC: 48 MG/DL (ref 40–59)
HGB BLD-MCNC: 12.9 G/DL
IMM GRANULOCYTES # BLD AUTO: 0.04 X10(3) UL (ref 0–1)
IMM GRANULOCYTES NFR BLD: 0.5 %
LDLC SERPL CALC-MCNC: 83 MG/DL (ref ?–100)
LYMPHOCYTES # BLD AUTO: 3.65 X10(3) UL (ref 1–4)
LYMPHOCYTES NFR BLD AUTO: 43.6 %
MCH RBC QN AUTO: 31.4 PG (ref 26–34)
MCHC RBC AUTO-ENTMCNC: 32.7 G/DL (ref 31–37)
MCV RBC AUTO: 96.1 FL
MONOCYTES # BLD AUTO: 0.63 X10(3) UL (ref 0.1–1)
MONOCYTES NFR BLD AUTO: 7.5 %
NEUTROPHILS # BLD AUTO: 3.88 X10 (3) UL (ref 1.5–7.7)
NEUTROPHILS # BLD AUTO: 3.88 X10(3) UL (ref 1.5–7.7)
NEUTROPHILS NFR BLD AUTO: 46.2 %
NONHDLC SERPL-MCNC: 119 MG/DL (ref ?–130)
OSMOLALITY SERPL CALC.SUM OF ELEC: 291 MOSM/KG (ref 275–295)
PLATELET # BLD AUTO: 297 10(3)UL (ref 150–450)
POTASSIUM SERPL-SCNC: 3.8 MMOL/L (ref 3.5–5.1)
PROT SERPL-MCNC: 7.3 G/DL (ref 5.7–8.2)
RBC # BLD AUTO: 4.11 X10(6)UL
SODIUM SERPL-SCNC: 139 MMOL/L (ref 136–145)
TRIGL SERPL-MCNC: 218 MG/DL (ref 30–149)
TSI SER-ACNC: 1.71 UIU/ML (ref 0.55–4.78)
VLDLC SERPL CALC-MCNC: 35 MG/DL (ref 0–30)
WBC # BLD AUTO: 8.4 X10(3) UL (ref 4–11)

## 2025-01-02 PROCEDURE — 85025 COMPLETE CBC W/AUTO DIFF WBC: CPT

## 2025-01-02 PROCEDURE — 80061 LIPID PANEL: CPT

## 2025-01-02 PROCEDURE — 84443 ASSAY THYROID STIM HORMONE: CPT

## 2025-01-02 PROCEDURE — 80053 COMPREHEN METABOLIC PANEL: CPT

## 2025-01-02 PROCEDURE — 36415 COLL VENOUS BLD VENIPUNCTURE: CPT

## 2025-01-02 NOTE — PROGRESS NOTES
Subjective:   Alma Rodriguez is a 84 year old female who presents for a MA AHA (Medicare Advantage Annual Health Assessment) and Subsequent Annual Wellness visit (Pt already had Initial Annual Wellness) and  issues as below .   Concerned about occasional bright red blood after wiping after hard bowel movements.    History/Other:   Fall Risk Assessment:   She has been screened for Falls and is High Risk. Fall Prevention information provided to patient in After Visit Summary.    Do you feel unsteady when standing or walking?: Yes  Do you worry about falling?: Yes  Have you fallen in the past year?: Yes  How many times have you fallen?: 1  Were you injured?: No     Cognitive Assessment:   Abnormal  What day of the week is this?: Correct  What month is it?: Correct  What year is it?: Correct  Recall \"Ball\": Correct  Recall \"Flag\": Correct  Recall \"Tree\": Incorrect      Functional Ability/Status:   Alma Rodriguez has some abnormal functions as listed below:  She has Vision problems based on screening of functional status. She has Walking problems based on screening of functional status.       Depression Screening (PHQ):  PHQ-2 SCORE: 0  , done 1/2/2025   Last [place] Suicide Screening on 1/2/2025 was No Risk.          Advanced Directives:   She has a Living Will on file in [facility]; reviewed and discussed documents with patient (and family/surrogate if present).  She has a Power of  for Health Care on file in [facility].  Patient has Advance Care Planning documents present in EMR. Reviewed documents with patient (and family/surrogate if present).      Patient Active Problem List   Diagnosis    Essential hypertension    Hyperlipidemia    Bipolar affective disorder in remission ([facility])    Lumbar radiculopathy    GERD without esophagitis    Age-related osteoporosis without current pathological fracture    ILD (interstitial lung disease) ([facility])     Allergies:  She is allergic to nitrofurantoin, penicillins, and  sulfa antibiotics.    Current Medications:  Outpatient Medications Marked as Taking for the 1/2/25 encounter (Office Visit) with Shikha He MD   Medication Sig    ATORVASTATIN 10 MG Oral Tab TAKE 1 TABLET(10 MG) BY MOUTH EVERY NIGHT    acetaminophen-codeine 300-30 MG Oral Tab Take 1 tablet by mouth daily as needed.    losartan 100 MG Oral Tab Take 1 tablet (100 mg total) by mouth daily.    Omeprazole 20 MG Oral Tab EC Take 20 mg by mouth in the morning and 20 mg before bedtime.    cholecalciferol 25 MCG (1000 UT) Oral Tab     acetaminophen 325 MG Oral Tab     Cranberry 400 MG Oral Cap     Fexofenadine HCl 180 MG Oral Tab Take 1 tablet (180 mg total) by mouth daily.    lamoTRIgine 150 MG Oral Tab Take 1 tablet (150 mg total) by mouth daily.    diazepam 2 MG Oral Tab Take 1 tablet (2 mg total) by mouth nightly as needed for Anxiety.       Medical History:  She  has a past medical history of Constipation, Depression, Essential hypertension, Hemorrhoids, High blood pressure, High cholesterol, Hyperlipidemia, and Lumbar radiculopathy (10/01/2018).  Surgical History:  She  has a past surgical history that includes tonsillectomy; colonoscopy; and colonoscopy (N/A, 11/7/2018).   Family History:  Her family history includes Breast Cancer (age of onset: 75) in her maternal aunt; Cancer in her paternal grandfather and paternal grandmother; Other in her father and mother.  Social History:  She  reports that she quit smoking about 34 years ago. Her smoking use included cigarettes. She started smoking about 35 years ago. She has a 15 pack-year smoking history. She has never used smokeless tobacco. She reports that she does not currently use alcohol after a past usage of about 1.0 standard drink of alcohol per week. She reports that she does not use drugs.    Tobacco:  She smoked tobacco in the past but quit greater than 12 months ago.  Social History     Tobacco Use   Smoking Status Former    Current packs/day: 0.00     Average packs/day: 0.5 packs/day for 30.0 years (15.0 ttl pk-yrs)    Types: Cigarettes    Start date:     Quit date: 1990    Years since quittin.1   Smokeless Tobacco Never   Tobacco Comments    I quit in           CAGE Alcohol Screen:   CAGE screening score of 0 on 2025, showing low risk of alcohol abuse.      Patient Care Team:  Shikha He MD as PCP - General (Family Medicine)    Review of Systems     Negative except see below    Objective:   Physical Exam  General Appearance:  Alert, cooperative, no distress, appears stated age   Head:  Normocephalic, without obvious abnormality, atraumatic   Eyes:  PERRL, conjunctiva/corneas clear, EOM's intact both eyes   Ears:  Normal TM's and external ear canals, both ears   Nose: Nares normal, septum midline,mucosa normal, no drainage or sinus tenderness   Throat: Lips, mucosa, and tongue normal; teeth and gums normal   Neck: Supple, symmetrical, trachea midline, no adenopathy;  thyroid: not enlarged, symmetric, no tenderness/mass/nodules; no carotid bruit or JVD   Back:   Symmetric, no curvature, ROM normal, no CVA tenderness   Lungs:   Clear to auscultation bilaterally, respirations unlabored   Heart:  Regular rate and rhythm, S1 and S2 normal, no murmur, rub, or gallop   Abdomen:   Soft, non-tender, bowel sounds active all four quadrants,  no masses, no organomegaly  Rectal exam large external hemorrhoids, no internal masses.   Pelvic: Deferred   Extremities: Extremities normal, atraumatic, no cyanosis or edema   Pulses: 2+ and symmetric   Skin: Skin color, texture, turgor normal, no rashes or lesions   Lymph nodes: Cervical, supraclavicular, and axillary nodes normal   Neurologic: Normal    and Breasts:  normal appearance, no masses or tenderness    BP (!) 161/80   Pulse 75   Temp (!) 96.1 °F (35.6 °C) (Temporal)   Ht 5' 0.63\" (1.54 m)   Wt 130 lb (59 kg)   SpO2 98%   BMI 24.86 kg/m²  Estimated body mass index is 24.86 kg/m² as  calculated from the following:    Height as of this encounter: 5' 0.63\" (1.54 m).    Weight as of this encounter: 130 lb (59 kg).    Medicare Hearing Assessment:   Hearing Screening    Time taken: 1/2/2025  8:47 AM  Entry User: Mitch Edelmira  Screening Method: Finger Rub  Finger Rub Result: Pass         Visual Acuity:   Right Eye Visual Acuity: Corrected Right Eye Chart Acuity: 20/40   Left Eye Visual Acuity: Corrected Left Eye Chart Acuity: 20/100   Both Eyes Visual Acuity: Corrected Both Eyes Chart Acuity: 20/40   Able To Tolerate Visual Acuity: Yes        Assessment & Plan:   Alma Rodriguez is a 84 year old female who presents for a Medicare Assessment.     1. Encounter for annual health examination (Primary)-patient is , lives independently with daughter nearby.  A lot of recent stress in the family.  Weight recently decreased 4 pounds from last visit.  She is concerned that she may have cancer.  Recently completed physical therapy for back and shoulder, planning to start regular exercise.  2. Bipolar affective disorder in remission ([facility])-followed by [facility].  Generally stable with lamotrigine.  3. ILD (interstitial lung disease) ([facility])-with minimal symptoms.  Followed by Dr. Zaman.  4. Essential hypertension-last visit was taking losartan/HCT but went back to losartan as she felt her home blood pressures were well-controlled with this alone.  Blood pressure elevated in office today but patient reports her home blood pressures have been systolic 120s.  She will call us within one 1 week with home blood pressure reading.  -     Lipid Panel; Future; Expected date: 01/02/2025  -     Comp Metabolic Panel (14); Future; Expected date: 01/02/2025  -     CBC With Differential With Platelet; Future; Expected date: 01/02/2025  -     Assay, Thyroid Stim Hormone; Future; Expected date: 01/02/2025  5. Hyperlipidemia, unspecified hyperlipidemia type-stable on atorvastatin   6. Age-related  osteoporosis without current pathological fracture-scheduled for DEXA scan  Overview:  2020, BMD 9/12/2022-Dr. [facility] tested and treating with Prolia.  9/2022 BMD osteopenia with increased fracture risk.  7. Lumbar radiculopathy-completed physical therapy, pain is improved but not resolved.  8. GERD without esophagitis-as seen Dr. Tan in the past.  Recommended to continue diet and lifestyle changes and take omeprazole.  9. External hemorrhoid-on exam large external hemorrhoids with some irritation.  Rectal exam without masses.  Reassurance given.  Maintain high-fiber diet.  10. Unintentional weight loss-reassured weight loss only minimal amount.  Reviewed good nutrition.  Check labs today.  Follow-up in 2 months sooner if worsening.  11. Blood in stool-last colonoscopy 2018.  Large external hemorrhoids with irritation likely source.  But patient concerned about possible cancer.  Check labs as ordered.  Referred to gastroenterology.  -     Gastro Referral - In Network    The patient indicates understanding of these issues and agrees to the plan.  Lab work ordered.  Reinforced healthy diet, lifestyle, and exercise.      Return in about 2 months (around 3/2/2025) for weight loss.     Shikha He MD, 1/2/2025     Supplementary Documentation:   General Health:  In the past six months, have you lost more than 10 pounds without trying?: 1 - Yes  Has your appetite been poor?: Yes  Type of Diet: Balanced  How does the patient maintain a good energy level?: Daily Walks;Other  How would you describe your daily physical activity?: Light  How would you describe your current health state?: Fair  How do you maintain positive mental well-being?: Visiting Family;Social Interaction;Games  On a scale of 0 to 10, with 0 being no pain and 10 being severe pain, what is your pain level?: 4 - (Moderate)  In the past six months, have you experienced urine leakage?: 1-Yes  At any time do you feel concerned for the safety/well-being of  yourself and/or your children, in your home or elsewhere?: No  Have you had any immunizations at another office such as Influenza, Hepatitis B, Tetanus, or Pneumococcal?: No    Health Maintenance   Topic Date Due    COVID-19 Vaccine (7 - 2024-25 season) 09/01/2024    Annual Well Visit  01/01/2025    HTN: BP Follow-Up  02/02/2025    Influenza Vaccine  Completed    DEXA Scan  Completed    Annual Depression Screening  Completed    Fall Risk Screening (Annual)  Completed    Pneumococcal Vaccine: 65+ Years  Completed    Zoster Vaccines  Completed

## 2025-01-27 ENCOUNTER — TELEPHONE (OUTPATIENT)
Dept: FAMILY MEDICINE CLINIC | Facility: CLINIC | Age: 85
End: 2025-01-27

## 2025-01-27 NOTE — TELEPHONE ENCOUNTER
Patient called back, verified Name and . Relayed message below. She will follow up with [facility]. Patient verbalized understanding and had no further questions at this time.

## 2025-01-27 NOTE — TELEPHONE ENCOUNTER
Received DEXA scan report from [facility].  Report shows osteopenia.  Dr. He recommends more bone density treatment.  Patient had Prolia with [facility] in the past.  Dr. He suggests either Dr. Saores or [facility].      Called patient, no answer.     Left message to call back and speak with a nurse.

## 2025-02-04 DIAGNOSIS — M54.16 LUMBAR RADICULOPATHY: ICD-10-CM

## 2025-02-07 RX ORDER — ACETAMINOPHEN AND CODEINE PHOSPHATE 300; 30 MG/1; MG/1
1 TABLET ORAL DAILY PRN
Qty: 20 TABLET | Refills: 0 | Status: SHIPPED | OUTPATIENT
Start: 2025-02-07

## 2025-02-07 NOTE — TELEPHONE ENCOUNTER
Please review; protocol failed/No Protocol    Recent Fills: No dispense history in the last 6 months     Last Rx Written: 08/07/2024    Last Office Visit: 01/02/2025    Requested Prescriptions   Pending Prescriptions Disp Refills    acetaminophen-codeine 300-30 MG Oral Tab 20 tablet 0     Sig: Take 1 tablet by mouth daily as needed.       Controlled Substance Medication Failed - 2/7/2025  2:20 PM        Failed - This medication is a controlled substance - forward to provider to refill        Passed - Medication is active on med list           Future Appointments         Provider Department Appt Notes    In 1 week Abdoul Tan MD [facility] Blood in stool    In 3 weeks Shikha He MD [facility] Weight loss and gastrointestinal issues    In 9 months Kim Zaman MD [facility] 12 months          Recent Outpatient Visits              1 month ago Encounter for annual health examination    [facility] Shikha He MD    Office Visit    3 months ago ILD (interstitial lung disease) (HCC)    [facility] Kim Zaman MD    Office Visit    7 months ago Essential hypertension    [facility] Shikha He MD    Office Visit    9 months ago GERD without esophagitis    [facility] Abdoul Tan MD    Office Visit    11 months ago Essential hypertension    [facility] Shikha He MD    Office Visit

## 2025-02-08 ENCOUNTER — TELEPHONE (OUTPATIENT)
Dept: FAMILY MEDICINE CLINIC | Facility: CLINIC | Age: 85
End: 2025-02-08

## 2025-02-08 DIAGNOSIS — M54.16 LUMBAR RADICULOPATHY: ICD-10-CM

## 2025-02-08 NOTE — TELEPHONE ENCOUNTER
Per Tushar, a Prior Authorization has been started for patient's Acetaminophen-Codeine 300-30mg Tablets. Login to [email or web address] and click \"Enter a Key\". Enter the patient's last name, date of birth and the Key:    Key: [id number]    Patient Last Name: Jennifer    : 1940    Complete the PA and click \"Send to Plan\" for approval. Please notify Tushar when a determination has been received from the plan.

## 2025-02-08 NOTE — TELEPHONE ENCOUNTER
Left message for the pharmacy to run the medication for a 7 day supply as it is covered by insurance.     Prior authorization for Acetaminophen-codeine completed through [employer], key [id number]       Information regarding your request  Claims for ACETAMINOPHEN-CODEINE 300MG-30MG TABLET are limited to a 7 day supply, or less, for opioid naive patients. If the patient is NOT opioid naive, please indicate the patient's diagnosis on the prescription and send the prescription to the pharmacy. The pharmacy is able to override this rejection at POS. If the pharmacy needs help processing this request, they can call [phone] for assistance.

## 2025-02-10 NOTE — TELEPHONE ENCOUNTER
Claims for ACETAMINOPHEN-CODEINE 300MG-30MG TABLET are limited to a 7 day supply, or less, for opioid naive patients. If the patient is NOT opioid naive, please indicate the patient's diagnosis on the prescription and send the prescription to the pharmacy. The pharmacy is able to override this rejection at POS. If the pharmacy needs help processing this request, they can call [phone] for assistance.

## 2025-02-12 ENCOUNTER — TELEPHONE (OUTPATIENT)
Dept: FAMILY MEDICINE CLINIC | Facility: CLINIC | Age: 85
End: 2025-02-12

## 2025-02-12 NOTE — TELEPHONE ENCOUNTER
Dr. He received MRI Hip w/o Contrast Right results.  Dr. He requesting appointment with patient to discuss results.      Called patient, confirmed name and .    Patient states she spoke with Dr. [facility]'s office today about the MRI.  She has the transvaginal ultrasound scheduled.  Is scheduled to see Dr. He on 3/3/25.  Patient states she recalls she had endometrial hyperplasia about 30 years ago and wonders if that has any significance now.  Patient advised to let Dr. He know during her visit on 3/3.

## 2025-02-17 ENCOUNTER — OFFICE VISIT (OUTPATIENT)
Facility: CLINIC | Age: 85
End: 2025-02-17
Payer: MEDICARE

## 2025-02-17 ENCOUNTER — TELEPHONE (OUTPATIENT)
Facility: CLINIC | Age: 85
End: 2025-02-17

## 2025-02-17 VITALS
DIASTOLIC BLOOD PRESSURE: 69 MMHG | HEIGHT: 59 IN | BODY MASS INDEX: 26.81 KG/M2 | WEIGHT: 133 LBS | SYSTOLIC BLOOD PRESSURE: 121 MMHG | HEART RATE: 80 BPM

## 2025-02-17 DIAGNOSIS — R19.4 CHANGE IN BOWEL HABITS: ICD-10-CM

## 2025-02-17 DIAGNOSIS — K59.04 CHRONIC IDIOPATHIC CONSTIPATION: ICD-10-CM

## 2025-02-17 DIAGNOSIS — R63.4 ABNORMAL WEIGHT LOSS: ICD-10-CM

## 2025-02-17 DIAGNOSIS — Z12.11 ENCOUNTER FOR SCREENING COLONOSCOPY: ICD-10-CM

## 2025-02-17 DIAGNOSIS — K21.9 GERD WITHOUT ESOPHAGITIS: Primary | ICD-10-CM

## 2025-02-17 DIAGNOSIS — K62.5 RECTAL BLEEDING: Primary | ICD-10-CM

## 2025-02-17 DIAGNOSIS — Z12.11 COLON CANCER SCREENING: ICD-10-CM

## 2025-02-17 PROCEDURE — 3074F SYST BP LT 130 MM HG: CPT | Performed by: INTERNAL MEDICINE

## 2025-02-17 PROCEDURE — 3078F DIAST BP <80 MM HG: CPT | Performed by: INTERNAL MEDICINE

## 2025-02-17 PROCEDURE — 99214 OFFICE O/P EST MOD 30 MIN: CPT | Performed by: INTERNAL MEDICINE

## 2025-02-17 PROCEDURE — 3008F BODY MASS INDEX DOCD: CPT | Performed by: INTERNAL MEDICINE

## 2025-02-17 RX ORDER — SODIUM, POTASSIUM,MAG SULFATES 17.5-3.13G
SOLUTION, RECONSTITUTED, ORAL ORAL
Qty: 1 EACH | Refills: 0 | Status: SHIPPED | OUTPATIENT
Start: 2025-02-17

## 2025-02-17 RX ORDER — HYDROCORTISONE 25 MG/G
CREAM TOPICAL
COMMUNITY
Start: 2024-10-29

## 2025-02-17 NOTE — TELEPHONE ENCOUNTER
Schedulers- Patient Alma was seen in office today by Dr. Tan, please call patient to schedule procedure per providers orders below. I reviewed and handed a copy of prep instructions with patient in office as well as medications. Patient is aware of different locations and our providers possibly booking out. No further questions asked.       GI schedulers -     Please schedule colonoscopy (76510) exam at [facility] ([facility])       MAC anesthesia        Suprep small volume bowel prep if covered by insurance, otherwise   Golytely (PEG) 4L bowel prep  Rx sent in to [facility]         DX = Rectal bleeding (k62.5) ; screening colonoscopy examination (z12.11)     Medication instructions:     None

## 2025-02-17 NOTE — PROGRESS NOTES
** Scroll down for HPI. **    ASSESSMENT/PLAN:     85 year old woman looking fit and healthy, history hypertension dyslipidemia and a long history of GERD symptoms returns today with her very supportive daughter for follow-up.    Initially saw Agnes Hodgson NP of our group 5 years ago; reassuring colonoscopy examination with me November 2018 as below.    Ms. Rodriguez and her daughter return today for follow-up regarding GERD symptoms, mild change in bowel patterns with constipation and straining, occasional nocturnal bowel movements.    Suggest:    GERD symptoms without dysphagia  Overall doing well on omeprazole 20 mg BID dosing  Fairly severe chronic GERD symptoms discussed today  Change evening dose of omeprazole 20 mg to 5-6 PM  Reported previous EGD examination mid 2018 apparently showed small uncomplicated hiatal hernia, mild gastritis only  At this point I described EGD examination as optional in this healthy non-smoker.  Last exam approximately 2015 was reassuring by her recollection.  She defers EGD examination at this point.    2.  Change in bowel patterns, mild chronic constipation now with straining, new nocturnal bowel movements   Try lactose-free/Lactaid milk  Watch for dietary triggers  Daily bowel regimen gentle laxative as per discharge instructions    3.  Recent episode painless rectal bleeding, blood dripping from anal area January 2025  History highly suggestive of hemorrhoidal type bleeding; proceed with next colonoscopy examination as below    4.  Recent concern for abnormal weight loss  Sounds a there is a large component of social, lifestyle issues with living alone small kitchen not cooking as much anymore  Very focused on dietary restrictions for the GERD and dyspepsia symptoms.  Colonoscopy examination to be scheduled as below  Last EGD examination was May 2018 as above; could consider repeating  No recent CT scan abdomen pelvis here.  Would consider repeating that first if ongoing concern  for abnormal weight loss or inability to gain weight.    5.  Colon cancer screening, average risk  No colon polyps on 2008 colonoscopy examination or the November 2018 colonoscopy examination  Next screening colonoscopy examination previously recommended November 2025-November 2028    As we are booked out 3-6 months, we will schedule next screening colonoscopy examination now.        Office visit 2/17/2025:  HPI:    Patient ID: Alma Rodriguez is a 85 year old woman looking fit and healthy, history hypertension dyslipidemia chronic lung disease following with Dr. Zaman here and a long history of GERD symptoms returns today with her very supportive daughter for follow-up.    Somewhat challenging, tangential interview today.    We reviewed the following concerns    1.  Ongoing subtle changes in bowel patterns, constipation/straining    As per previous visit, Ms. Rodriguez describes passing approximately 3 bowel movements in the mornings.  She repeatedly has the urge to go, but at least the first visit/BM sometimes is not productive, sometimes significant straining.  She believes that she has to strain just about every day.  Usually on the second or third urge to return to the commode she will pass a fairly large bowel movement.    She does seem to get relief from this morning ritual on days when she needs to leave early for an appointment or trip, seems to have less bowel movements that day.    This sounds like it was the case last time.  More recently, Ms. Rodriguez describes a change with passing several nocturnal bowel movements per week.    Ms. Rodriguez describes awakening at least once per night to urinate.  Several times per week, she will feel a bowel movement coming and pass it when she is up to urinate.    This is not diarrhea.    2.  One episode of bright red blood, blood dripping from anal area    Today Ms. Rodriguez describes 1 episode of rectal bleeding with a bowel movement in early January 2025.  She  describes blood coating the stool, blood literally dripping out of her when she stood up, blood dripping onto the floor.    This was painless.    This had her very alarmed.    3.  Ongoing frequent GERD symptoms    Ms. Rodriguez remains very focused on dietary factors.  [facility] with a tiny kitchen and feels very limited by what she can cook.  Often eats frozen meals.    She appears to have good control taking omeprazole 20 mg BID dosing and Tums as needed.    No dysphagia.    As before, Ms. Rodriguez is extremely focused on dietary restrictions.  She takes little or no coffee every day, avoids tomato sauces heavier meals.    Ms. Rodriguez is very concerned about her weight.  She has lost weight periodically including with moving up here in 2018.  During the pandemic, she gained 6-8 pounds up to 138-139 LBS.  She is very concerned as recently she was down to 130 LBS, weighing in today with coat and shoes at 133 LBS.  She seems very worried by these fluctuations in her weight.      Recent labs 1/2/2025:  Reassuring CBC with hemoglobin 12.9g normocytic; platelets 297,000  Normal renal function  AST 22 ALT 18 alk phosphatase 49    TSH 1.710  Total cholesterol 161, serum triglycerides 218    ======================  Previous visit 4/24/2024:     Alma Rodriguez is a woman looking fit and healthy, history hypertension dyslipidemia and a long history of GERD symptoms returns today with her very supportive daughter for follow-up.    Initially saw Agnes Hodgson NP of our group 5 years ago; reassuring colonoscopy examination with me November 2018 as below.    Ms. Rodriguez and her daughter return today for follow-up regarding GERD symptoms, mild change in bowel patterns.    1.  GERD symptoms    This is truly Ms. Rodriguez's chief complaint.  It sounds like she feels some degree of burning heartburn, burning reflux almost every day.    Currently takes omeprazole 20 mg twice daily.  Takes a second dose around  bedtime.  She has briefly been prescribed Nexium in the past.    Even on the omeprazole, Ms. Rodriguez describes heartburn symptoms several times per week, usually in the mornings.  She describes burning acid, upset stomach, occasional vomiting.    Morning symptoms are relieved by something simple as a glass of milk, certainly a dose of Tums.    Unusually strict in her dietary restrictions trying to avoid the GERD symptoms.  She actually been afraid to take Tums thinking it would be bad for her.  Does sleep on extra pillows.    We discussed diet and lifestyle measures as per today's discharge handout.      2.  Change in bowel patterns.    Ms. Rodriguez describes a slight increase in her stool output.  She passes about 3 bowel movements over the course of each morning which are formed, but soft but never loose not urgency.    First bowel movement starts pretty early, as early as 4 AM which has her concerned.  That does interfere with getting enough sleep.  She passes about 3 bowel movements over the course of the morning.    Recently recommended the peppermint oil, IBgard products.    On review of systems, this is not diarrhea or urgency.  No blood with the stools.  No dysphagia with the GERD symptoms.      Former tobacco smoker, quit over 30 years ago  No family history colorectal cancer.      Recent labs:  CBC 11/7/2023 normal with hemoglobin 12.7g MCV 94.7  Normal renal function  AST 22 ALT 20 alk phosphatase 54  TSH 1.186    Wt Readings from Last 20 Encounters:   01/02/25 130 lb (59 kg)   11/04/24 134 lb (60.8 kg)   06/13/24 134 lb (60.8 kg)   04/24/24 136 lb 3.2 oz (61.8 kg)   03/12/24 133 lb 6.4 oz (60.5 kg)   03/06/24 135 lb (61.2 kg)   11/07/23 135 lb (61.2 kg)   07/05/23 139 lb (63 kg)   05/08/23 138 lb 6 oz (62.8 kg)   04/19/23 142 lb (64.4 kg)   01/03/23 134 lb (60.8 kg)   11/14/22 131 lb (59.4 kg)   11/10/22 132 lb (59.9 kg)   09/15/22 129 lb (58.5 kg)   06/07/22 133 lb (60.3 kg)   09/22/21 135 lb (61.2 kg)    05/27/21 136 lb 3.2 oz (61.8 kg)   09/21/20 139 lb (63 kg)   09/23/19 133 lb 6.4 oz (60.5 kg)   03/11/19 130 lb 9.6 oz (59.2 kg)         Previous EGD examination: Approx 2015   Previous colonoscopy(ies): 2018    [facility]    Review of Systems    ======================  Previous visit Agnes Hodgson 10/18/2018:    Alma Rodriguez is a 78 year old year-old female pt of Dr. He with history of GERD/hiatal hernia, hypertension, hyperlipidemia, lumbar radiculopathy, bipolar disorder, anxiety, depression, who presents for evaluation of unexpected weight loss.     Patient presents with her daughter who was present for the duration of the interview and physical exam.           Patient presents at the request of her PCP for evaluation of GERD, weight loss, and to discuss a screening colonoscopy.     She recently moved from [facility] to the [facility].  She reports an EGD which took place approximately 3 months ago.  Records are not available for review.  She also reports lab work within the last year though these records are also not available at the time of the office visit.     She was started on omeprazole 20 mg daily following her EGD.  This resolved her GERD symptoms.  After she stopped taking the medication she noticed a recurrence of symptoms.  She restarted omeprazole on her own initiative which has been helping her symptoms.  She reports very rare breakthrough symptoms despite the omeprazole.  This is responsive to Tums.     No other associated upper GI symptoms including nausea, vomiting, hematemesis, dysphagia, odynophagia.  No chest pain or shortness of breath.      She reports a colonoscopy about 10 years ago also completed in [place] though these records are not available.  She denies any findings including colon polyps.  No significant family history of GI cancers or chronic GI problems.     She reports concern regarding her weight.  Her weight had changed from a the scale at her PCPs office in  [place] compared with her newly established PCP in [place].  Today in office her weight is up 228 pounds.  She reports her baseline is 131 pounds.  She does not on a scale at home and does not weigh herself regularly.       She believes this change in weight may have been due to decreased appetite/eating as she would often \"forgets to eat lunch\" due to recent stressors.  She admits to being a lifelong \"very anxious person\" stating that anxiety runs in multiple members of her family     She also complains of intermittent constipation x 2 years.  This happens very occasionally, possibly 6-8 times in the last 2 years.  It is responsive to MiraLAX.  Reports her dietary fiber intake is moderately good.  Her water intake could be improved.  Denies associated abdominal pain, other changes in bowel habits recently, hematochezia, or melena.           Pertinent Surgical Hx:  No abdominal surgery  - See additional surgical hx below  - No known issues with sedation.  - No known history of sleep apnea.     Pertinent Family Hx:  - No family hx of esophageal, gastric or colon cancer  - No family history of IBD.     Prior endoscopies:  ~3 months ago EGD performed by unspecified GI practice in [place] r/t GERD, findings per pt: small hiatal hernia, gastritis, on omeprazole x 30 days      ~10 years ago colonoscopy performed by unspecified GI practice, findings per patient: Unremarkable     Social Hx:  + Former smoker, quit 15-20 years ago  - No Etoh  - Denies illicit drug use   - LMP: Postmenopausal  - Occupation: Retired  - Lives with daughter  - NSAIDs/ASA use: Occasionally    ======================  3/07/2023: CT CHEST(CONTRAST ONLY) (CPT=71260)     COMPARISON: [facility], CT CARDIAC OVER READ, 11/14/2022, 11:46 AM.     INDICATIONS: R59.0 Mediastinal adenopathy     TECHNIQUE: CT images of the chest were obtained with non-ionic intravenous contrast material.      FINDINGS:  Mediastinum:  Multiple subcentimeter bilateral  mediastinal and hilar lymph nodes are present measuring up to 8 millimeter in size.  No adenopathy  Heart:  Normal size heart and great vessels.  Normal pericardium.  Lungs and pleura:  There is mild symmetric peripheral fibrosis in the mid to upper lungs, apical predominant.  This is accompanied by mild areas of biapical pleural thickening.  Minimal peripheral fibrosis posterior right mid lung.  No basilar fibrosis.   Focal air trapping right lung base.  No diffuse mosaicism.  No bronchiectasis or nodules or cysts.  No ground-glass opacity.  There is no pulmonary mass.  Bones:  No fractures or lesions  Upper abdomen:  Partly visible 1.8 centimeter cyst in the superior right kidney and a subcentimeter cyst in the left kidney        CONCLUSION:      Mild peripheral fibrosis throughout both upper lungs. Differential diagnosis: pleuroparenchymal fibroelastosis (PPFE), chronic hypersensitivity pneumonitis (chronic HP),  chronic nitrofurantoin use.     Stable multiple subcentimeter lymph nodes throughout the bilateral mediastinum and marlene, most likely secondary to the underlying pulmonary fibrosis            Dictated by (CST): Denny Webb MD on 3/07/2023 at 12:27 PM         ======================    COLONOSCOPY PROCEDURE REPORT     DATE OF PROCEDURE:  11/7/2018      PCP: Shikha He MD     PREOPERATIVE DIAGNOSIS: Screening colonoscopy examination     POSTOPERATIVE DIAGNOSIS:  See impression.     SURGEON:  Abdoul Tan M.D.     SEDATION:    MAC anesthesia provided by the Anesthesia Service.  MAC anesthesia requested due to history of anxiety, anticipated intolerance of colonoscopy examination under conscious sedation medications.     COLONOSCOPY PROCEDURE:   After the nature and risks of colonoscopy examination under MAC anesthesia were discussed with the patient and all questions answered, informed consent was obtained.  The patient was sedated as above.       Digital rectal exam was performed which  showed no masses.  The Olympus pediatric video colonoscope was placed in the patient's rectum and advanced under direct visualization through the entire length of the colon up to the cecum and terminal ileum.  Retroflexed exam performed up the ascending colon to the hepatic flexure.  The cecum was confirmed by landmarks including appendiceal orifice, cecal strap, ileocecal valve.  Retroflexion was performed in the rectum.     The quality of the prep was good.     COLONOSCOPY FINDINGS:    Mild colonic diverticulosis ascending colon; mild-moderate diverticulosis descending and sigmoid colon.  Small internal hemorrhoids.     RECOMMENDATIONS:  High fiber diet.  Follow-up with our office as needed.  Follow-up colon screening by stool testing or colonoscopy as per patient's clinical condition in 7-10 years.       Current Outpatient Medications   Medication Sig Dispense Refill    acetaminophen-codeine 300-30 MG Oral Tab Take 1 tablet by mouth daily as needed. 20 tablet 0    ATORVASTATIN 10 MG Oral Tab TAKE 1 TABLET(10 MG) BY MOUTH EVERY NIGHT 90 tablet 3    losartan 100 MG Oral Tab Take 1 tablet (100 mg total) by mouth daily. 90 tablet 3    Omeprazole 20 MG Oral Tab EC Take 20 mg by mouth in the morning and 20 mg before bedtime. 180 tablet 1    cholecalciferol 25 MCG (1000 UT) Oral Tab       acetaminophen 325 MG Oral Tab       Cranberry 400 MG Oral Cap  60 capsule 5    Fexofenadine HCl 180 MG Oral Tab Take 1 tablet (180 mg total) by mouth daily.      lamoTRIgine 150 MG Oral Tab Take 1 tablet (150 mg total) by mouth daily. 90 tablet 1    diazepam 2 MG Oral Tab Take 1 tablet (2 mg total) by mouth nightly as needed for Anxiety. 30 tablet 5         Allergies:  Allergies   Allergen Reactions    Nitrofurantoin OTHER (SEE COMMENTS)     Lung fibrosis    Penicillins UNKNOWN    Sulfa Antibiotics RASH     Imaging: No results found.       PHYSICAL EXAM:   Physical Exam                   Prior to this encounter, I spent over 10 minutes  preparing for the visit, including reviewing documents from other specialties as well as from PCP and going over test results. During and after the face to face encounter, I spent an additional 40 minutes today discussing the above and counseling and educating this patient and family, reviewing chart notes and data and documenting clinical information in the EHR, independently interpreting results and communicating results to the patient/family and composing this comprehensive note, ordering medications or testing, referring and communicating with other healthcare providers, and care coordination with the patient's other providers.      Digital transcription software was utilized to produce this note. The note was proofread for content only. Typographical errors may remain.        Meds This Visit:  Requested Prescriptions      No prescriptions requested or ordered in this encounter       Imaging & Referrals:  None       ID#[id number]

## 2025-02-18 NOTE — TELEPHONE ENCOUNTER
Scheduled for:  Colonoscopy 90568  Provider Name:     Date:  Thursday, 09/04/2025   Location:  [facility]   Sedation:  Mac   Time:  9am (pt is aware [facility] will call with arrival time     Prep:  Golytely   Meds/Allergies Reconciled?:  Yes    Diagnosis with codes:   Rectal bleeding (k62.5) ; screening colonoscopy examination (z12.11)     Was patient informed to call insurance with codes (Y/N): Yes      Referral sent?:  Referral was sent at the time of electronic surgical scheduling.    [facility] or [facility] notified?:  I sent an electronic request to Endo Scheduling and received a confirmation today.       Medication Orders:  None  Misc Orders:  None     Further instructions given by staff:  I discussed the prep instructions with the patient which she verbally understood and is aware that I will send the instructions today.via [employer]

## 2025-03-03 ENCOUNTER — OFFICE VISIT (OUTPATIENT)
Dept: FAMILY MEDICINE CLINIC | Facility: CLINIC | Age: 85
End: 2025-03-03
Payer: MEDICARE

## 2025-03-03 VITALS
SYSTOLIC BLOOD PRESSURE: 121 MMHG | OXYGEN SATURATION: 98 % | BODY MASS INDEX: 27 KG/M2 | DIASTOLIC BLOOD PRESSURE: 72 MMHG | HEART RATE: 89 BPM | WEIGHT: 133 LBS

## 2025-03-03 DIAGNOSIS — R93.89 ABNORMAL MRI: Primary | ICD-10-CM

## 2025-03-03 DIAGNOSIS — I10 ESSENTIAL HYPERTENSION: ICD-10-CM

## 2025-03-03 DIAGNOSIS — R63.4 UNINTENTIONAL WEIGHT LOSS: ICD-10-CM

## 2025-03-03 PROCEDURE — 99214 OFFICE O/P EST MOD 30 MIN: CPT | Performed by: FAMILY MEDICINE

## 2025-03-03 PROCEDURE — G2211 COMPLEX E/M VISIT ADD ON: HCPCS | Performed by: FAMILY MEDICINE

## 2025-03-03 PROCEDURE — 3078F DIAST BP <80 MM HG: CPT | Performed by: FAMILY MEDICINE

## 2025-03-03 PROCEDURE — 3074F SYST BP LT 130 MM HG: CPT | Performed by: FAMILY MEDICINE

## 2025-03-03 NOTE — PROGRESS NOTES
Subjective:   Alma Rodriguez is a 85 year old female who presents for Follow - Up (2 month weight loss F/u. Pt would like to discuss results with Dr. Torres. // //The following individual(s) verbally consented to be recorded using ambient AI listening technology and understand that they can each withdraw their consent to this listening technology at any po)       History/Other:   History of Present Illness  Alma Rodriguez is an 85 year old female who presents for follow-up on weight loss and uterine health.    She has experienced unintentional weight loss of approximately six to seven pounds over the past six to seven months. She attributes this to dietary changes and finds it challenging to prepare nutritious meals due to limited kitchen space and energy. She has made efforts to consume more calorie-dense foods and has gained approximately three pounds since her last visit.    She has a history of endometrial hyperplasia diagnosed approximately 40 years ago. She recalls taking postmenopausal hormone replacement therapy briefly at that time to manage hot flashes but discontinued it due to concerns about potential side effects. A past MRI of the hip showed arthritis and endometrial thickening. She has not experienced any symptoms such as bleeding or spotting related to this finding. No vaginal spotting or bleeding.    In terms of her social history, she notes difficulty in cooking due to the size of her kitchen and counter space, which impacts her ability to prepare meals efficiently. She occasionally receives meals from family, such as a aparicio's pie from her son-in-law, which helps supplement her diet.   Chief Complaint Reviewed and Verified  Nursing Notes Reviewed and   Verified  Tobacco Reviewed  Allergies Reviewed  Medications Reviewed    OB Status Reviewed         Tobacco:  She smoked tobacco in the past but quit greater than 12 months ago.  Social History     Tobacco Use   Smoking  Status Former    Current packs/day: 0.00    Average packs/day: 0.5 packs/day for 30.0 years (15.0 ttl pk-yrs)    Types: Cigarettes    Start date:     Quit date: 1990    Years since quittin.3   Smokeless Tobacco Never   Tobacco Comments    I quit in         Current Outpatient Medications   Medication Sig Dispense Refill    hydrocortisone 2.5 % External Cream APPLY EXTERNALLY TO THE AFFECTED AREA TWICE DAILY WHEN NO SATELLITE PUSTULES PRESENT.      Na Sulfate-K Sulfate-Mg Sulf (SUPREP BOWEL PREP KIT) 17.5-3.13-1.6 GM/177ML Oral Solution Take as directed 1 each 0    acetaminophen-codeine 300-30 MG Oral Tab Take 1 tablet by mouth daily as needed. 20 tablet 0    ATORVASTATIN 10 MG Oral Tab TAKE 1 TABLET(10 MG) BY MOUTH EVERY NIGHT 90 tablet 3    losartan 100 MG Oral Tab Take 1 tablet (100 mg total) by mouth daily. 90 tablet 3    Omeprazole 20 MG Oral Tab EC Take 20 mg by mouth in the morning and 20 mg before bedtime. 180 tablet 1    cholecalciferol 25 MCG (1000 UT) Oral Tab       acetaminophen 325 MG Oral Tab       Cranberry 400 MG Oral Cap  60 capsule 5    Fexofenadine HCl 180 MG Oral Tab Take 1 tablet (180 mg total) by mouth daily.      lamoTRIgine 150 MG Oral Tab Take 1 tablet (150 mg total) by mouth daily. 90 tablet 1    diazepam 2 MG Oral Tab Take 1 tablet (2 mg total) by mouth nightly as needed for Anxiety. 30 tablet 5            Review of Systems:  Pertinent items are noted in HPI.      Objective:   /72   Pulse 89   Wt 133 lb (60.3 kg)   SpO2 98%   BMI 26.86 kg/m²  Estimated body mass index is 26.86 kg/m² as calculated from the following:    Height as of 25: 4' 11\" (1.499 m).    Weight as of this encounter: 133 lb (60.3 kg).    Results  RADIOLOGY  MRI of the hip: arthritis    PATHOLOGY  Endometrial thickening     Physical Exam  MEASUREMENTS: Weight- 133.  CHEST: Lungs clear to auscultation bilaterally.  CARDIOVASCULAR: Heart regular rate and rhythm.  ABDOMEN: Abdomen soft,  non-tender, no masses, no rebound, no guarding. Liver normal, no hepatomegaly. Spleen normal, no splenomegaly.  EXTREMITIES: No significant swelling in extremities.      Assessment & Plan:   1. Abnormal MRI (Primary)  2. Essential hypertension  3. Unintentional weight loss    Assessment & Plan  Endometrial Thickening  History of endometrial hyperplasia 40 years ago. Recent MRI showed endometrial thickening. No vaginal spotting or bleeding.  -Plan for ultrasound to further evaluate endometrial thickening.  -Will discuss results and further plan once results are available.    Unintentional Weight Loss  Lost 6-7 pounds over 6-7 months. Weight has stabilized over the past month.  -Continue with nutritious calorie-dense foods.  -Weight to be monitored at follow-up appointments.    General Health Maintenance / Followup Plans  -Schedule follow-up appointment in three months (early June 2025).        Return in about 3 months (around 6/3/2025) for Multiple issues.        Shikha He MD, 3/3/2025, 9:46 AM

## 2025-03-04 ENCOUNTER — HOSPITAL ENCOUNTER (OUTPATIENT)
Dept: ULTRASOUND IMAGING | Age: 85
Discharge: HOME | End: 2025-03-04
Attending: ORTHOPAEDIC SURGERY
Payer: MEDICARE

## 2025-03-04 DIAGNOSIS — R10.2 PELVIC AND PERINEAL PAIN: ICD-10-CM

## 2025-03-04 PROCEDURE — 76856 US EXAM PELVIC COMPLETE: CPT | Performed by: ORTHOPAEDIC SURGERY

## 2025-03-04 PROCEDURE — 76830 TRANSVAGINAL US NON-OB: CPT | Performed by: ORTHOPAEDIC SURGERY

## 2025-03-10 ENCOUNTER — TELEPHONE (OUTPATIENT)
Dept: FAMILY MEDICINE CLINIC | Facility: CLINIC | Age: 85
End: 2025-03-10

## 2025-03-10 DIAGNOSIS — R93.89 ENDOMETRIAL THICKENING ON ULTRASOUND: Primary | ICD-10-CM

## 2025-03-10 NOTE — TELEPHONE ENCOUNTER
Patient calling to ask to have Dr. He review pelvic ultrasound completed on 03/04/5, asking for call back once reviewed. Ordered by Dr. Sparrow.

## 2025-03-11 NOTE — TELEPHONE ENCOUNTER
Called gyne and patient scheduled for 3/24 at 1:15pm.    Called patient, confirmed name and .    Patient advised of Dr. He's note and scheduled appointment.

## 2025-03-11 NOTE — TELEPHONE ENCOUNTER
Patient contacted.  States Dr. Sparrow is an orthopedist.  US was ordered from incidental findings on MRI.  Patient inquires on next steps.  Dr. He please advise.

## 2025-03-24 ENCOUNTER — OFFICE VISIT (OUTPATIENT)
Dept: OBGYN CLINIC | Facility: CLINIC | Age: 85
End: 2025-03-24
Payer: COMMERCIAL

## 2025-03-24 VITALS
SYSTOLIC BLOOD PRESSURE: 130 MMHG | WEIGHT: 135 LBS | DIASTOLIC BLOOD PRESSURE: 72 MMHG | HEIGHT: 59 IN | BODY MASS INDEX: 27.21 KG/M2

## 2025-03-24 DIAGNOSIS — R93.89 THICKENED ENDOMETRIUM: Primary | ICD-10-CM

## 2025-03-24 NOTE — H&P
NEW PATIENT ANNUAL EXAM    HPI: Alma Rodriguez presents with her daughter for evaluation of abnormal pelvic imaging. Patient underwent MRI of her hip due to pain and abnormal thickening of the endometrium was noted. This was re-demonstrated on pelvic ultrasound. She has had no cramping, bleeding or unusual discharge.     OBGYN Hx  OBHx:   OB History    Para Term  AB Living   2 2           SAB IAB Ectopic Multiple Live Births                  # Outcome Date GA Lbr Lalito/2nd Weight Sex Type Anes PTL Lv   2 Para            1 Para              Gynecologic Hx:    No LMP recorded. Patient is postmenopausal.  Menopause: age 56, no PMB  HRT: never  STI Hx: denies  Abnormal Pap: denies     Social history:     ROS  Review of Systems   Constitutional:  Negative for appetite change and fever.   Respiratory:  Negative for chest tightness and shortness of breath.    Cardiovascular:  Negative for chest pain, palpitations and leg swelling.   Gastrointestinal:  Negative for abdominal pain, constipation and diarrhea.   Endocrine: Negative for cold intolerance, heat intolerance, polydipsia and polyuria.   Genitourinary: Negative.  Negative for dyspareunia, dysuria, genital sores, menstrual problem, pelvic pain, urgency and vaginal discharge.   Musculoskeletal:  Negative for myalgias.   Neurological: Negative.  Negative for dizziness and headaches.   Psychiatric/Behavioral:  Negative for dysphoric mood and suicidal ideas.        Past Medical History:    Constipation    Depression    Essential hypertension    Hemorrhoids    High blood pressure    High cholesterol    Hyperlipidemia    Lumbar radiculopathy       Past Surgical History:   Procedure Laterality Date    Colonoscopy      Colonoscopy N/A 2018    Procedure: COLONOSCOPY;  Surgeon: Abdoul Tan MD;  Location: [facility] ENDO    Tonsillectomy         Current Outpatient Medications   Medication Sig Dispense Refill    hydrocortisone 2.5 % External  Cream APPLY EXTERNALLY TO THE AFFECTED AREA TWICE DAILY WHEN NO SATELLITE PUSTULES PRESENT.      Na Sulfate-K Sulfate-Mg Sulf (SUPREP BOWEL PREP KIT) 17.5-3.13-1.6 GM/177ML Oral Solution Take as directed 1 each 0    acetaminophen-codeine 300-30 MG Oral Tab Take 1 tablet by mouth daily as needed. 20 tablet 0    ATORVASTATIN 10 MG Oral Tab TAKE 1 TABLET(10 MG) BY MOUTH EVERY NIGHT 90 tablet 3    losartan 100 MG Oral Tab Take 1 tablet (100 mg total) by mouth daily. 90 tablet 3    Omeprazole 20 MG Oral Tab EC Take 20 mg by mouth in the morning and 20 mg before bedtime. 180 tablet 1    cholecalciferol 25 MCG (1000 UT) Oral Tab       acetaminophen 325 MG Oral Tab       Cranberry 400 MG Oral Cap  60 capsule 5    Fexofenadine HCl 180 MG Oral Tab Take 1 tablet (180 mg total) by mouth daily.      lamoTRIgine 150 MG Oral Tab Take 1 tablet (150 mg total) by mouth daily. 90 tablet 1    diazepam 2 MG Oral Tab Take 1 tablet (2 mg total) by mouth nightly as needed for Anxiety. 30 tablet 5       Allergies[1]    /72   Ht 59\"   Wt 135 lb (61.2 kg)   BMI 27.27 kg/m²       PHYSICAL EXAM  General: NAD, well-appearing  Neck: thyroid palpated not enlarged, no lymphadenopathy  Chest: RRR, lungs CTAB  Breast Exam: symmetric bilaterally without palpable masses or skin changes, no nipple discharge  Abdomen: soft, non-tender   Pelvic exam  Vulva: normal  Vagina: mucosa atrophic with no discharge, cervix is grossly normal, pinpoint os  Bimanual exam: no CMT, small uterus no adnexal tenderness or fullness  Extremities: symmetric bilaterally     FINDINGS:  UTERUS:   Uterus measures 6.6 x 3.6 x 4.5 cm.     ENDOMETRIUM: Abnormal fluid collection within the endometrial canal consistent with blood/seroma measures 3.5 x 1.5 x 3.2 cm individual endometrial thickness along the anterior posterior margins measuring 1.6 mm and 0.9 mm.  Findings may reflect  underlying cervical stenosis versus obstructive endocervical lesion.  Consider histologic  correlation.  MYOMETRIUM: Isoechoic uterine leiomyoma lower uterine segment measures 1.5 x 1.6 x 1.3 cm.     OVARIES AND ADNEXA:  RIGHT:   Mildly atrophic right ovary measures 1.8 x 1.3 x 1.3 cm.  LEFT:   Nonvisualized left ovary obscured by bowel.     CUL-DE-SAC:   Normal.  No free fluid or mass.    OTHER: Negative.  Bladder appears normal.                 Impression   CONCLUSION:  1. Large abnormal endometrial fluid collection measuring 3.5 x 1.5 x 3.2 cm most likely intraluminal chronic hematoma/seroma.  Suspect underlying endocervical lesion/cervical stenosis accounting for retained fluid within the endometrium.  Consider  histologic correlation.  Atrophic endometrial lining.  2. Uterine leiomyoma lower uterine segment measures 1.6 x 1.5 cm.  3. Mildly atrophic right ovary with small punctate calcification of doubtful significance.  Nonvisualized left ovary either markedly atrophic or obscured by bowel.        EMB PROCEDURE    Risks, benefits and alternatives to the procedure were explained to the patient. We discussed risks including bleeding, infection, and uterine perforation. We discussed high PPV but low NPV of EMB and possible need for further diagnostics should pathology results come back inconclusive. All questions were answered and consent was signed.     A speculum was inserted into the vagina. The cervix was cleansed with betadine. The anterior lip of the cervix was grasped with a single tooth tenaculum. An attempt was made to pass the pipelle through the cervix but this was not successful. An os finder could not be advanced more than 2mm through the os. Dilation with a lacrimal dilator was attempted but there was too much resistance at the os and the procedure was aborted.       A/P: Alma Rodriguez is a 85 year old  presenting for consultation regarding abnormal ultrasound results.     #Endometrial thickening  -discussed ddx including malignancy, endometrial polyp, hematocolpos secondary  to cervical stenosis  -patient prefers to avoid general anesthesia and the OR  -will re-attempt office procedure with misoprostol for cervical softening. Discussed possibility of cruciate incision of cervix to gain access into the cavity. Will plan for paracervical block with plain lidocaine for analgesia.    Follow up for procedure     Neeru Salinas DO           [1]   Allergies  Allergen Reactions    Nitrofurantoin OTHER (SEE COMMENTS)     Lung fibrosis    Penicillins UNKNOWN    Sulfa Antibiotics RASH

## 2025-04-17 ENCOUNTER — TELEPHONE (OUTPATIENT)
Dept: OBGYN CLINIC | Facility: CLINIC | Age: 85
End: 2025-04-17

## 2025-04-17 RX ORDER — MISOPROSTOL 200 UG/1
200 TABLET ORAL
Qty: 1 TABLET | Refills: 0 | Status: SHIPPED | OUTPATIENT
Start: 2025-04-17

## 2025-04-17 NOTE — TELEPHONE ENCOUNTER
Order placed      Neeru Salinas DO to Me (Selected Message)        25  9:51 AM  200mcg vaginally night before the procedure       Last visit 2025     A/P: Alma Rodriguez is a 85 year old  presenting for consultation regarding abnormal ultrasound results.      #Endometrial thickening  -discussed ddx including malignancy, endometrial polyp, hematocolpos secondary to cervical stenosis  -patient prefers to avoid general anesthesia and the OR  -will re-attempt office procedure with misoprostol for cervical softening. Discussed possibility of cruciate incision of cervix to gain access into the cavity. Will plan for paracervical block with plain lidocaine for analgesia.     Follow up for procedure      Neeru Salinas DO

## 2025-04-17 NOTE — TELEPHONE ENCOUNTER
Incoming call from patient calling to request if Doctor has prescribed medication to soften her cervix for biopsy on April 21, 2025     Please assist. Thank you.

## 2025-04-18 NOTE — PROGRESS NOTES
GYNE PROBLEM VISIT     HPI:   Alma Rodriguez is a 85 year old  female presenting for endometrial biopsy for incidental finding of 3cm endometrial thickening.      Past Medical History[1]    Past Surgical History[2]    Family History[3]    Medications (Active prior to today's visit):  Current Medications[4]    Allergies:  Allergies[5]    There were no vitals taken for this visit.    PROCEDURE:     EMB PROCEDURE    Risks, benefits and alternatives to the procedure were explained to the patient. We discussed risks including bleeding, infection, and uterine perforation. We discussed high PPV but low NPV of EMB and possible need for further diagnostics should pathology results come back inconclusive. All questions were answered and consent was signed.     A speculum was inserted into the vagina. The cervix was cleansed with betadine. A paracervical block was performed using about 4mL of plain 1% lidocaine.  The anterior lip of the cervix was grasped with a single tooth tenaculum. The pipelle could not be advanced through the os and an os finder could only be advanced about 1cm beyond the ectocervix. A cruciate incision was made at the os using a scalpel. The pipelle was then advanced through the cervix and into the endometrial cavity and suction was applied to obtain a sample. A total of three 3 passes were performed yielding primarily clear mucoid fluid. The specimen was placed in formalin and sent to pathology. The tenaculum was removed and hemostasis achieved with Monsel's solution. The speculum was removed. Patient tolerated the procedure well.         ASSESSMENT/PLAN:       #Endometrial thickening   -procedure as above  -reviewed potential for inconclusive findings which may necessitate hysteroscopy under anesthesia   -bleeding/infection precautions reviewed     Follow up pending results     Neeru Salinas DO                 [1]   Past Medical History:   Constipation    Depression    Essential  hypertension    Hemorrhoids    High blood pressure    High cholesterol    Hyperlipidemia    Lumbar radiculopathy   [2]   Past Surgical History:  Procedure Laterality Date    Colonoscopy      Colonoscopy N/A 2018    Procedure: COLONOSCOPY;  Surgeon: Abdoul Tan MD;  Location: [facility] ENDO    Tonsillectomy     [3]   Family History  Problem Relation Age of Onset    Other (Other) Father     Other (Other) Mother     Breast Cancer Maternal Aunt 75    Cancer Paternal Grandfather          age 89 esophageal or laryngeal cancer    Cancer Paternal Grandmother         I think cervical at age 95    Ovarian Cancer Neg    [4]   Current Outpatient Medications   Medication Sig Dispense Refill    miSOPROStol (CYTOTEC) 200 MCG Oral Tab Place 1 tablet (200 mcg total) vaginally nightly. 1 tablet 0    hydrocortisone 2.5 % External Cream APPLY EXTERNALLY TO THE AFFECTED AREA TWICE DAILY WHEN NO SATELLITE PUSTULES PRESENT.      Na Sulfate-K Sulfate-Mg Sulf (SUPREP BOWEL PREP KIT) 17.5-3.13-1.6 GM/177ML Oral Solution Take as directed 1 each 0    acetaminophen-codeine 300-30 MG Oral Tab Take 1 tablet by mouth daily as needed. 20 tablet 0    ATORVASTATIN 10 MG Oral Tab TAKE 1 TABLET(10 MG) BY MOUTH EVERY NIGHT 90 tablet 3    losartan 100 MG Oral Tab Take 1 tablet (100 mg total) by mouth daily. 90 tablet 3    Omeprazole 20 MG Oral Tab EC Take 20 mg by mouth in the morning and 20 mg before bedtime. 180 tablet 1    cholecalciferol 25 MCG (1000 UT) Oral Tab       acetaminophen 325 MG Oral Tab       Cranberry 400 MG Oral Cap  60 capsule 5    Fexofenadine HCl 180 MG Oral Tab Take 1 tablet (180 mg total) by mouth daily.      lamoTRIgine 150 MG Oral Tab Take 1 tablet (150 mg total) by mouth daily. 90 tablet 1    diazepam 2 MG Oral Tab Take 1 tablet (2 mg total) by mouth nightly as needed for Anxiety. 30 tablet 5   [5]   Allergies  Allergen Reactions    Nitrofurantoin OTHER (SEE COMMENTS)     Lung fibrosis    Penicillins UNKNOWN     Sulfa Antibiotics RASH

## 2025-04-21 ENCOUNTER — OFFICE VISIT (OUTPATIENT)
Dept: OBGYN CLINIC | Facility: CLINIC | Age: 85
End: 2025-04-21
Payer: COMMERCIAL

## 2025-04-21 VITALS
SYSTOLIC BLOOD PRESSURE: 132 MMHG | WEIGHT: 135 LBS | BODY MASS INDEX: 27.21 KG/M2 | DIASTOLIC BLOOD PRESSURE: 74 MMHG | HEIGHT: 59 IN

## 2025-04-21 DIAGNOSIS — R93.89 THICKENED ENDOMETRIUM: Primary | ICD-10-CM

## 2025-04-21 PROCEDURE — 88305 TISSUE EXAM BY PATHOLOGIST: CPT | Performed by: OBSTETRICS & GYNECOLOGY

## 2025-05-09 NOTE — TELEPHONE ENCOUNTER
----- Message from Arianna Pollack RN sent at 1/26/2022  5:07 PM CST -----  Regarding: FW: Second infection of Covid.       ----- Message -----  From: Mamadou Pantoja  Sent: 1/26/2022   5:04 PM CST  To: Em Rn Triage  Subject: Second infection of Covid.
Action Requested: Summary for Provider     []  Critical Lab, Recommendations Needed  [] Need Additional Advice  [x]   FYI    []   Need Orders  [] Need Medications Sent to Pharmacy  []  Other     SUMMARY: Patient complains of fever, fatigue, and nasal conge
Called [facility] and spoke with Zulma Benitez RN and confirmed that they do have the MAB infusion as long as  patient meet the criteria.   Spoke with patient, informations provided including the [facility] address, phone number and hours, instructed to go today and i
Called patient and left a message. I will call again in the morning. I think she should get an antibody infusion. She meets Tier 3 criteria and it has been under 5 days since start of symptoms. She will need to be directed to IC or ER.   TAVO to triage i
Patient called in and states he daughter called the [facility] immediate care and heard message that immediate care was no longer giving MAB.  Notified patient that per documentation below nurse confirmed that patient would be able to receive per Dr. Atif marie
Spoke with patient and informed her she would benefit from antibody infusion. Pt agreed.   Please reach out to her with information and directions to [facility]
negative...

## 2025-06-03 ENCOUNTER — OFFICE VISIT (OUTPATIENT)
Dept: FAMILY MEDICINE CLINIC | Facility: CLINIC | Age: 85
End: 2025-06-03

## 2025-06-03 VITALS
HEART RATE: 87 BPM | DIASTOLIC BLOOD PRESSURE: 74 MMHG | WEIGHT: 134 LBS | BODY MASS INDEX: 27.01 KG/M2 | SYSTOLIC BLOOD PRESSURE: 138 MMHG | TEMPERATURE: 98 F | OXYGEN SATURATION: 98 % | HEIGHT: 59 IN

## 2025-06-03 DIAGNOSIS — R93.89 ENDOMETRIAL THICKENING ON ULTRASOUND: Primary | ICD-10-CM

## 2025-06-03 DIAGNOSIS — K64.9 HEMORRHOIDS, UNSPECIFIED HEMORRHOID TYPE: ICD-10-CM

## 2025-06-03 DIAGNOSIS — M54.16 LUMBAR RADICULOPATHY: ICD-10-CM

## 2025-06-03 PROCEDURE — 3078F DIAST BP <80 MM HG: CPT | Performed by: FAMILY MEDICINE

## 2025-06-03 PROCEDURE — 99213 OFFICE O/P EST LOW 20 MIN: CPT | Performed by: FAMILY MEDICINE

## 2025-06-03 PROCEDURE — G2211 COMPLEX E/M VISIT ADD ON: HCPCS | Performed by: FAMILY MEDICINE

## 2025-06-03 PROCEDURE — 3075F SYST BP GE 130 - 139MM HG: CPT | Performed by: FAMILY MEDICINE

## 2025-06-03 PROCEDURE — 3008F BODY MASS INDEX DOCD: CPT | Performed by: FAMILY MEDICINE

## 2025-06-03 NOTE — PROGRESS NOTES
The following individual(s) verbally consented to be recorded using ambient AI listening technology and understand that they can each withdraw their consent to this listening technology at any point by asking the clinician to turn off or pause the recording:    Patient name: Alma Rodriguez  Additional names:

## 2025-06-03 NOTE — PROGRESS NOTES
Subjective:   Alma Rodriguez is a 85 year old female who presents for Follow - Up (Follow up biopsy of endometrium last month )       History/Other:   History of Present Illness  Alma Rodriguez is an 85 year old female who presents for follow-up regarding rectal bleeding and colonoscopy scheduling.    She experiences painless rectal bleeding, with blood occasionally on toilet paper after difficult bowel movements. A previous episode involved copious bleeding, but it has not recurred. A colonoscopy is scheduled for September, but insurance coverage issues arise as her Medicare Advantage plan does not cover it as a screening test. She is exploring options for coverage as a diagnostic procedure due to her symptoms.    Her bowel movements have become more regular with dietary changes and medications, including Colace, Miralax, and occasional consumption of dates and licorice. Stools are 'squishy' and difficult to clean, but diarrhea is not present. She has reduced her intake of Raisin Bran and prunes.    She has regained weight lost due to stress. She has arthritis affect her mobility, necessitating a handicap placard. She lives independently with some assistance from her children.      Chief Complaint Reviewed and Verified  Nursing Notes Reviewed and   Verified  Tobacco Reviewed  Allergies Reviewed  Medications Reviewed    OB Status Reviewed         Tobacco:  She smoked tobacco in the past but quit greater than 12 months ago.  Tobacco Use[1]     Current Medications[2]           Review of Systems:  See above      Objective:   /74 (BP Location: Right arm, Patient Position: Sitting, Cuff Size: adult)   Pulse 87   Temp 98.1 °F (36.7 °C) (Temporal)   Ht 4' 11\" (1.499 m)   Wt 134 lb (60.8 kg)   SpO2 98%   BMI 27.06 kg/m²    Estimated body mass index is 27.06 kg/m² as calculated from the following:    Height as of this encounter: 4' 11\" (1.499 m).    Weight as of this encounter: 134 lb  (60.8 kg).  Results  RADIOLOGY  No results found.       Physical Exam  Physical Exam  GENERAL: Alert, well developed, well nourished, no acute distress.  CHEST: Clear to auscultation bilaterally.  CARDIOVASCULAR: Normal heart rate and rhythm, no murmurs.  ABDOMEN: Soft, non-tender.  EXTREMITIES: No edema in ankles.      Assessment & Plan:   1. Endometrial thickening on ultrasound (Primary)  2. Hemorrhoids, unspecified hemorrhoid type  3. Lumbar radiculopathy      Assessment & Plan  Assessment & Plan  Rectal Bleeding  Intermittent rectal bleeding likely due to hemorrhoids. Colonoscopy scheduled for September requires reclassification as diagnostic due to insurance issues. Low likelihood of serious condition given absence of severe anemia and nature of bleeding. Colonoscopy decision based on her comfort with potential cancer risk.  - Contact gastroenterology department to reclassify colonoscopy as diagnostic due to rectal bleeding.    Constipation Management  Constipation managed with Colace, Miralax, and dietary fiber. Reports softer stools but difficult to clean. Adjust regimen to prevent overly soft stools and straining, which may worsen hemorrhoids. Recommend bidet attachment for easier cleaning.  - Continue Colace at a reduced dose.  - Adjust fiber intake to manage stool consistency.  - Consider using a bidet attachment for easier cleaning.    Lumbar radiculopathy  And arthritis contributes to need for handicap parking placard.   - Assist with obtaining a replacement handicap parking placard.            Return in about 7 months (around 1/3/2026) for Routine physical.      Shikha He MD              [1]   Social History  Tobacco Use   Smoking Status Former    Current packs/day: 0.00    Average packs/day: 0.5 packs/day for 30.0 years (15.0 ttl pk-yrs)    Types: Cigarettes    Start date:     Quit date: 1990    Years since quittin.5   Smokeless Tobacco Never   Tobacco Comments    I quit in     [2]   Current Outpatient Medications   Medication Sig Dispense Refill    hydrocortisone 2.5 % External Cream APPLY EXTERNALLY TO THE AFFECTED AREA TWICE DAILY WHEN NO SATELLITE PUSTULES PRESENT.      Na Sulfate-K Sulfate-Mg Sulf (SUPREP BOWEL PREP KIT) 17.5-3.13-1.6 GM/177ML Oral Solution Take as directed 1 each 0    acetaminophen-codeine 300-30 MG Oral Tab Take 1 tablet by mouth daily as needed. 20 tablet 0    ATORVASTATIN 10 MG Oral Tab TAKE 1 TABLET(10 MG) BY MOUTH EVERY NIGHT 90 tablet 3    losartan 100 MG Oral Tab Take 1 tablet (100 mg total) by mouth daily. 90 tablet 3    Omeprazole 20 MG Oral Tab EC Take 20 mg by mouth in the morning and 20 mg before bedtime. 180 tablet 1    cholecalciferol 25 MCG (1000 UT) Oral Tab       acetaminophen 325 MG Oral Tab       Cranberry 400 MG Oral Cap  60 capsule 5    Fexofenadine HCl 180 MG Oral Tab Take 1 tablet (180 mg total) by mouth daily.      lamoTRIgine 150 MG Oral Tab Take 1 tablet (150 mg total) by mouth daily. 90 tablet 1    diazepam 2 MG Oral Tab Take 1 tablet (2 mg total) by mouth nightly as needed for Anxiety. 30 tablet 5

## 2025-06-27 DIAGNOSIS — M54.16 LUMBAR RADICULOPATHY: ICD-10-CM

## 2025-06-30 RX ORDER — ACETAMINOPHEN AND CODEINE PHOSPHATE 300; 30 MG/1; MG/1
1 TABLET ORAL DAILY PRN
Qty: 20 TABLET | Refills: 0 | Status: SHIPPED | OUTPATIENT
Start: 2025-06-30

## 2025-06-30 NOTE — TELEPHONE ENCOUNTER
Please review. Protocol Failed; No Protocol      Recent fills: 2/10/2025, 4/1/2025  Last Rx written: 2/7/2025  Last office visit: 6/3/2025

## 2025-07-01 RX ORDER — LOSARTAN POTASSIUM 100 MG/1
100 TABLET ORAL DAILY
Qty: 90 TABLET | Refills: 3 | Status: SHIPPED | OUTPATIENT
Start: 2025-07-01

## 2025-07-01 NOTE — TELEPHONE ENCOUNTER
Refill Per Protocol     Requested Prescriptions   Pending Prescriptions Disp Refills    LOSARTAN 100 MG Oral Tab [Pharmacy Med Name: LOSARTAN 100MG TABLETS] 90 tablet 3     Sig: TAKE 1 TABLET(100 MG) BY MOUTH DAILY       Hypertension Medications Protocol Passed - 7/1/2025  4:15 PM        Passed - CMP or BMP in past 12 months        Passed - Last BP reading less than 140/90     BP Readings from Last 1 Encounters:   06/03/25 138/74               Passed - In person appointment or virtual visit in the past 12 mos or appointment in next 3 mos     Recent Outpatient Visits              4 weeks ago Endometrial thickening on ultrasound    [facility] Shikha He MD    Office Visit    2 months ago Thickened endometrium    [facility] - OB/GYN Neeru Salinas DO    Office Visit    3 months ago Thickened endometrium    [facility] - [facility] Neeru Salinas DO    Office Visit    4 months ago Abnormal MRI    [facility] [place]    Office Visit    4 months ago GERD without esophagitis    [facility]    Office Visit          Future Appointments         Provider Department Appt Notes    In 2 months [facility]YN, PROCEDURE [facility]    In 4 months Kim Zaman MD [facility] 12 months                    Passed - EGFRCR or GFRNAA > 50     GFR Evaluation  EGFRCR: 72 , resulted on 1/2/2025          Passed - Medication is active on med list

## 2025-07-31 ENCOUNTER — TELEPHONE (OUTPATIENT)
Facility: CLINIC | Age: 85
End: 2025-07-31

## 2025-07-31 DIAGNOSIS — K62.5 RECTAL BLEEDING: Primary | ICD-10-CM

## 2025-07-31 DIAGNOSIS — Z12.11 SCREEN FOR COLON CANCER: ICD-10-CM

## (undated) DIAGNOSIS — R30.0 DYSURIA: Primary | ICD-10-CM

## (undated) DIAGNOSIS — N39.0 RECURRENT UTI: Primary | ICD-10-CM

## (undated) DEVICE — ENDOSCOPY PACK - LOWER: Brand: MEDLINE INDUSTRIES, INC.

## (undated) DEVICE — Device: Brand: DEFENDO AIR/WATER/SUCTION AND BIOPSY VALVE

## (undated) NOTE — LETTER
Alma Rodriguez, :1940    CONSENT FOR PROCEDURE/SEDATION    1. I authorize the performance upon Alma Rodriguez  the following: Endometrial Biopsy    2. I authorize Dr. Neeru Salinas DO (and whomever is designated as the doctor’s assistant), to perform the above-mentioned procedures.    3. If any unforeseen conditions arise during this procedure calling for additional  procedures, operations, or medications (including anesthesia and blood transfusion), I further request and authorize the doctor to do whatever he/she deems advisable in my interest.    4. I consent to the taking and reproduction of any photographs in the course of this procedure for professional purposes.    5. I consent to the administration of such sedation as may be considered necessary or advisable by the physician responsible for this service, with the exception of ______________________________________________________    6. I have been informed by my doctor of the nature and purpose of this procedure sedation, possible alternative methods of treatment, risk involved and possible complications.      Signature of Patient:_______________________________________________    Signature of person authorized to consent for patient:  _______________________________________________________________    Relationship to patient: ____________________________________________    Witness: _________________________________________ Date:___________     Physician Signature: _______________________________ Date:___________

## (undated) NOTE — LETTER
[facility]  Administration of Anesthesia  1. Antwon Saul, or _________________________________ acting on her behalf, (Patient) (Dependent/Representative) request to receive anesthesia for my pending procedure/operation/treatment. infections, high spinal block, spinal bleeding, seizure, cardiac arrest and death. 7. AWARENESS: I understand that it is possible (but unlikely) to have explicit memory of events from the operating room while under general anesthesia.   8. ELECTROCONVULSIV unconscious pt /Relationship    My signature below affirms that prior to the time of the procedure, I have explained to the patient and/or his/her guardian, the risks and benefits of undergoing anesthesia, as well as any reasonable alternatives.     _______

## (undated) NOTE — LETTER
[facility]     [] NEW APPLICANT  [address] [place] [address]  [x] RENEWAL            Persons with Disabilities Certification for Parking Placard  *This form is valid for three months from your physician's signature date for a Temporary Placard and six months for a Permanent Placard.    NOTE TO ALL DISABILITY LICENSE PLATE OWNERS:  If you have a disability license plate, you MUST complete the form and renew your placard.    DIRECTIONS: Both sides of this document must be signed and completed fully. All fields are required.   Applicants complete Part 1. If the applicant is a MINOR, then Parent/Guardian(s) MUST also complete Part 2. The applicant's medical professional MUSTcomplete Part 3. If the applicant is applying for meter-exempt parking, his/her medical professional MUST also complete Part 4.    PART 1:   Applicant Information (MUST have a valid [place] 's license and/or ID card)  I hereby certify  that I meet the definition of a person with a disability as provided in [facility] 5/1-159.1, and I certify  that my physical condition entitles me to the issuance of a Persons with Disabilities Parking Placard. By affixing my signature below, I understand that the parking placard may not be used unless I am the  or passenger of the vehicle.     *If a  , please provide a copy of your  showing proof of service.     Disability Parking Placard # (if any)     Full Name of Person with Disability (If minor, complete Part 2 also)    Alma Rodriguez  Male/Female  female  Date of Birth   1/22/1940    Valid [place] ’s License or ID Card # of Applicant                                                                                                  [place] Address  [address] [address]    Mailing Address if Different from Above   Telephone Number  [phone] (home)  Email Address   [email or web address]  ?  Yes/No     Signature of Person with Disability Today’s Date  6/3/2025     PART 2:   For Parent or Legal Guardian (MUST have a valid 's license and/or ID card)  I hereby certify that the above applicant is a minor and I have primary responsibility for his/her transportation. By affixing my signature below, I understand that the disability placard is issued to the person with the disability and may not be used unless I am transporting the disabled person in the vehicle.     Name of Parent or Legal Guardian   Relationship to Person with Disability   Valid [place] ’s License or ID Card #          [place] Address Apt/Unit# City State Zip   Telephone Number Email Address   Signature of Parent or Legal Guardian Today’s Date  6/3/2025     Warning: Any misuse of the disability parking placard/plates or making a false application may result in the revocation of the placard, a 12-month suspension or revocation of your drivers license, and a fine of up to $1,000.    Temporary Disabled Parking Placard Applications -- May be taken to any [facility] facility or mailed in.  Permanent Disables Parking Placard Applications -- MUST be mailed to the following address:  , Persons with Disabilities Placard Unit, [address] [place] [address], [address], [place] [address].    *If you have a permanent disability placard and would like a Persons with Disabilities License Plate, please visit your local  facility to apply. You will need your permanent placard number and current plate number or CB.     Please complete Page 2 to ensure timely processing.    Printed by authority of the [facility]. July 2021 -- 1 -- VSD 62.28          Part 3: Medical Eligibility Standards and Medical Professionals Certification  As the medical professional(s) executing this document and verifying the nature of the applicant's disability, I understand that making a false representation of a person's  disability for the purposes of obtaining any type of a disabled parking placard may result in suspension or revocation of my license and a fine of up to $1,000. As a licensed physician, advanced practice nurse, optometrist, chiropractor or physician's assistant, I certify the applicant has a condition that constitutes him/her as a person with disabilities.   Length of Disability: (Check one)   []   Temporary Disability; the duration of this disability is _____________________ (maximum 6 months)  []   Permanent Disability  []   Meter-Exempt Disability (Must complete and sign Part 4 also.)  Check all that apply (MUST check at least one):  []  Is restricted by a lung disease to such a degree that the person’s forced (respiratory) expiratory volume (FEV) for 1 second, when measured by spirometry, is less than 1 liter.  []   Uses a portable oxygen device.  []   Has a Class III or Class IV cardiac condition according to the standards set by the American Heart Association.  []   Cannot walk without the use of or assistance from a wheelchair, a walker, a crutch, a brace, a prosthetic device or another person.  []   Is severely limited in the ability to walk due to an arthritic, neurological, oncological or orthopedic condition.  []   Cannot walk 200 feet without stopping to rest because of one of the above five conditions.  Check all that apply: (MUST check at least one diagnosis):  []Amputation of extremity(s)_________________ [] Arthritis of the ______________________  []Spina Bifida     []Osteoarthritis of the __________________   []Multiple Sclerosis    [] Chronic Pain due to ___________________  []Quadriplegia/Paraplegia   [] Legally Blind with limited mobility  [] Cerebral Palsy  [] Other Diagnosis: _____LUMBAR RADICULOPATHY_________________________________________________________    If none of the above conditions apply, list the medical condition that impacts the person’s mobility.     Medical Professional’s Printed  Name*   Shikha He MD Specialty  Family Medicine    Office Address   [facility], [address] [address]  Dept: [phone]  Dept Fax: [phone]   Medical Professional’s Signature   State Professional License Number (NOT NPI#)   ([id number]) Today’s Date                  6/3/2025    Signature of Collaborating/Supervising Physician (if signed above by resident/assistant) Supervising State Professional License Number             PART 4: Medical Eligibility for Meter-Exempt Parking  The meter-exempt parking certification must be completed only when the applicant qualifies. To qualify, the applicant MUST have a VALID  [place] 's license, have an ambulatory disability described in Part 3, and also have one of the following conditions listed below.  Economic need is not a consideration for meter-exempt parking    The applicant is eligible for meter-exempt parking as provided by statue due to the following PERMANENT medical condition or disability:    Check all that apply:  [] Cannot manage, manipulate, or insert coins, or obtain tickets in parking meters/ticket machines due to lack of fine motor control of BOTH hands.  [] Cannot reach above his/her head to a height of 42 inches from the ground due to a lack of finger, hand or upper-extremity strength or mobility.  [] Cannot approach a parking meter due to his/her use of a wheelchair or other device for mobility.  [] Cannot walk more than 20 feet due to an orthopedic, neurological, cardiovascular, or lung condition in which the degree of debilitation is so severe that it almost completely impedes the ability to walk.  [] Missing a hand(s) or arm(s) or has permanently lost the use of a hand or arm.  [] Patient is under 18 years of age and incapable of driving.     Medical Professional’s Signature State Professional License Number (NOT NPI#)   Today’s Date                  6/3/2025    Signature of  Collaborating/Supervising Physician (if signed above by resident/assistant) Supervising State Professional License Number     FOR  OFFICE USE ONLY     Parking Placard Number:  Expiration Date:   Issued By: Issued Date:

## (undated) NOTE — LETTER
[address], [place]  Authorization for Invasive Procedures  1.  I hereby authorize Dr. Alla Iraheta** , my physician and whomever may be designated as the doctor's assistant, to perform the following operation and/or procedure:  *COLON performed for the purposes of advancing medicine, science, and/or education, provided my identity is not revealed. If the procedure has been videotaped, the physician/surgeon will obtain the original videotape.  The hospital will not be responsible for stor My signature below affirms that prior to the time of the procedure, I have explained to the patient and/or her legal representative, the risks and benefits involved in the proposed treatment and any reasonable alternative to the proposed treatment.  I have

## (undated) NOTE — ED AVS SNAPSHOT
[facility] in [place] [address]    Phone:  [phone]           Latrelle Fleischer   MRN: [id number]    Department:  [facility]   Date of Visit:  5/2/2017 as tolerated. If needed more frequently then go immediately to the ER.      Discharge References/Attachments     BRONCHITIS, NO ANTIBIOTIC (ADULT) (ENGLISH)      Disclosure     Insurance plans vary and the physician(s) referred by the Immediate Care may no Registration line at [phone] or find a doctor online by visiting [email or web address].    IF THERE IS ANY CHANGE OR WORSENING OF YOUR CONDITION, CALL YOUR PRIMARY CARE PHYSICIAN AT ONCE OR GO TO [address] [place] [address].     If you have been prescribed a - If you are a smoker or have smoked in the last 12 months, we encourage you to explore options for quitting.     - If you have concerns related to behavioral health issues or thoughts of harming yourself, contact [facility] a

## (undated) NOTE — Clinical Note
TAVO Doss requested you for her upcoming colonoscopy as her daughter is a patient of yours. Please feel free to review my note and let me know if you have any questions or concerns.